# Patient Record
Sex: MALE | Race: WHITE | Employment: OTHER | URBAN - METROPOLITAN AREA
[De-identification: names, ages, dates, MRNs, and addresses within clinical notes are randomized per-mention and may not be internally consistent; named-entity substitution may affect disease eponyms.]

---

## 2020-06-06 ENCOUNTER — HOSPITAL ENCOUNTER (EMERGENCY)
Age: 55
Discharge: HOME | End: 2020-06-06
Attending: EMERGENCY MEDICINE
Payer: MEDICAID

## 2020-06-06 ENCOUNTER — APPOINTMENT (OUTPATIENT)
Dept: GENERAL RADIOLOGY | Age: 55
End: 2020-06-06
Payer: MEDICAID

## 2020-06-06 VITALS
TEMPERATURE: 97.8 F | SYSTOLIC BLOOD PRESSURE: 130 MMHG | HEART RATE: 84 BPM | OXYGEN SATURATION: 100 % | RESPIRATION RATE: 20 BRPM | DIASTOLIC BLOOD PRESSURE: 90 MMHG

## 2020-06-06 PROCEDURE — 99283 EMERGENCY DEPT VISIT LOW MDM: CPT

## 2020-06-06 PROCEDURE — 73090 X-RAY EXAM OF FOREARM: CPT

## 2020-06-06 PROCEDURE — 6370000000 HC RX 637 (ALT 250 FOR IP): Performed by: PHYSICIAN ASSISTANT

## 2020-06-06 PROCEDURE — 6370000000 HC RX 637 (ALT 250 FOR IP): Performed by: EMERGENCY MEDICINE

## 2020-06-06 RX ORDER — SULFAMETHOXAZOLE AND TRIMETHOPRIM 800; 160 MG/1; MG/1
1 TABLET ORAL 2 TIMES DAILY
Qty: 14 TABLET | Refills: 0 | Status: SHIPPED | OUTPATIENT
Start: 2020-06-06 | End: 2020-06-13

## 2020-06-06 RX ORDER — CEPHALEXIN 500 MG/1
1000 CAPSULE ORAL ONCE
Status: COMPLETED | OUTPATIENT
Start: 2020-06-06 | End: 2020-06-06

## 2020-06-06 RX ORDER — IBUPROFEN 800 MG/1
800 TABLET ORAL EVERY 8 HOURS PRN
Qty: 20 TABLET | Refills: 0 | Status: SHIPPED | OUTPATIENT
Start: 2020-06-06 | End: 2020-11-10 | Stop reason: ALTCHOICE

## 2020-06-06 RX ORDER — LIDOCAINE HYDROCHLORIDE AND EPINEPHRINE 10; 10 MG/ML; UG/ML
20 INJECTION, SOLUTION INFILTRATION; PERINEURAL ONCE
Status: DISCONTINUED | OUTPATIENT
Start: 2020-06-06 | End: 2020-06-06 | Stop reason: HOSPADM

## 2020-06-06 RX ORDER — SULFAMETHOXAZOLE AND TRIMETHOPRIM 800; 160 MG/1; MG/1
1 TABLET ORAL ONCE
Status: COMPLETED | OUTPATIENT
Start: 2020-06-06 | End: 2020-06-06

## 2020-06-06 RX ORDER — ACETAMINOPHEN 325 MG/1
650 TABLET ORAL ONCE
Status: COMPLETED | OUTPATIENT
Start: 2020-06-06 | End: 2020-06-06

## 2020-06-06 RX ORDER — CEPHALEXIN 500 MG/1
1000 CAPSULE ORAL 2 TIMES DAILY
Qty: 28 CAPSULE | Refills: 0 | Status: SHIPPED | OUTPATIENT
Start: 2020-06-06 | End: 2020-06-13

## 2020-06-06 RX ADMIN — ACETAMINOPHEN 650 MG: 325 TABLET ORAL at 03:03

## 2020-06-06 RX ADMIN — SULFAMETHOXAZOLE AND TRIMETHOPRIM 1 TABLET: 800; 160 TABLET ORAL at 02:57

## 2020-06-06 RX ADMIN — CEPHALEXIN 1000 MG: 500 CAPSULE ORAL at 02:57

## 2020-06-06 ASSESSMENT — ENCOUNTER SYMPTOMS
ABDOMINAL PAIN: 0
BACK PAIN: 0
SORE THROAT: 0
COUGH: 0
DIARRHEA: 0
SHORTNESS OF BREATH: 0
COLOR CHANGE: 1
FACIAL SWELLING: 0
NAUSEA: 0
RHINORRHEA: 0
VOMITING: 0
CHEST TIGHTNESS: 0

## 2020-06-06 ASSESSMENT — PAIN SCALES - GENERAL
PAINLEVEL_OUTOF10: 10
PAINLEVEL_OUTOF10: 10

## 2020-06-06 ASSESSMENT — PAIN DESCRIPTION - ORIENTATION: ORIENTATION: LEFT

## 2020-06-06 ASSESSMENT — PAIN DESCRIPTION - PAIN TYPE: TYPE: ACUTE PAIN

## 2020-06-06 ASSESSMENT — PAIN DESCRIPTION - LOCATION: LOCATION: ARM

## 2020-06-06 NOTE — ED NOTES
Discharge instructions and prescriptions reviewed with patient. Pt verbalized understanding. Pt discharged home with family.        Gail Early RN  06/06/20 1297

## 2020-06-06 NOTE — ED PROVIDER NOTES
ED Attending Attestation Note     Date of evaluation: 6/6/2020    This patient was seen by the advance practice provider. I have seen and examined the patient, agree with the workup, evaluation, management and diagnosis. The care plan has been discussed. My assessment reveals well appering 48 y.o. M with history of IVDU who presents with pain and swelling of the left forearm. Minimal erythema but edema. No palpable fluctuance. US shows cobblestoning of the subcutaneous tissues c/w cellulitis. No discrete abscess. PA performed I&D under my supervision with no purulent drainage. Plain film with no signs of gas and exam not consistent with necrotizing infection. Abx initiated. Importance of return for worsening sings of infection discussed with patient. No murmur or other stigmata of endocarditis.         Stephen Recio MD  06/06/20 9995

## 2020-08-03 ENCOUNTER — APPOINTMENT (OUTPATIENT)
Dept: GENERAL RADIOLOGY | Age: 55
End: 2020-08-03
Payer: MEDICAID

## 2020-08-03 ENCOUNTER — HOSPITAL ENCOUNTER (EMERGENCY)
Age: 55
Discharge: HOME | End: 2020-08-03
Attending: EMERGENCY MEDICINE
Payer: MEDICAID

## 2020-08-03 VITALS
HEIGHT: 71 IN | OXYGEN SATURATION: 99 % | WEIGHT: 170 LBS | BODY MASS INDEX: 23.8 KG/M2 | SYSTOLIC BLOOD PRESSURE: 134 MMHG | TEMPERATURE: 97.4 F | HEART RATE: 84 BPM | DIASTOLIC BLOOD PRESSURE: 84 MMHG | RESPIRATION RATE: 16 BRPM

## 2020-08-03 LAB
BASOPHILS ABSOLUTE: 0.1 K/UL (ref 0–0.2)
BASOPHILS RELATIVE PERCENT: 1.2 %
C-REACTIVE PROTEIN: 2 MG/L (ref 0–5.1)
EOSINOPHILS ABSOLUTE: 0.3 K/UL (ref 0–0.6)
EOSINOPHILS RELATIVE PERCENT: 6.6 %
HCT VFR BLD CALC: 40.7 % (ref 40.5–52.5)
HEMOGLOBIN: 14 G/DL (ref 13.5–17.5)
LYMPHOCYTES ABSOLUTE: 1.3 K/UL (ref 1–5.1)
LYMPHOCYTES RELATIVE PERCENT: 26.8 %
MCH RBC QN AUTO: 30.6 PG (ref 26–34)
MCHC RBC AUTO-ENTMCNC: 34.4 G/DL (ref 31–36)
MCV RBC AUTO: 88.9 FL (ref 80–100)
MONOCYTES ABSOLUTE: 0.7 K/UL (ref 0–1.3)
MONOCYTES RELATIVE PERCENT: 13.9 %
NEUTROPHILS ABSOLUTE: 2.6 K/UL (ref 1.7–7.7)
NEUTROPHILS RELATIVE PERCENT: 51.5 %
PDW BLD-RTO: 13.2 % (ref 12.4–15.4)
PLATELET # BLD: 284 K/UL (ref 135–450)
PMV BLD AUTO: 7 FL (ref 5–10.5)
RBC # BLD: 4.58 M/UL (ref 4.2–5.9)
SEDIMENTATION RATE, ERYTHROCYTE: 18 MM/HR (ref 0–20)
WBC # BLD: 5 K/UL (ref 4–11)

## 2020-08-03 PROCEDURE — 85025 COMPLETE CBC W/AUTO DIFF WBC: CPT

## 2020-08-03 PROCEDURE — 85652 RBC SED RATE AUTOMATED: CPT

## 2020-08-03 PROCEDURE — 99283 EMERGENCY DEPT VISIT LOW MDM: CPT

## 2020-08-03 PROCEDURE — 6370000000 HC RX 637 (ALT 250 FOR IP): Performed by: EMERGENCY MEDICINE

## 2020-08-03 PROCEDURE — 73610 X-RAY EXAM OF ANKLE: CPT

## 2020-08-03 PROCEDURE — 36415 COLL VENOUS BLD VENIPUNCTURE: CPT

## 2020-08-03 PROCEDURE — 86140 C-REACTIVE PROTEIN: CPT

## 2020-08-03 PROCEDURE — 73630 X-RAY EXAM OF FOOT: CPT

## 2020-08-03 RX ORDER — HYDROCODONE BITARTRATE AND ACETAMINOPHEN 5; 325 MG/1; MG/1
1 TABLET ORAL ONCE
Status: COMPLETED | OUTPATIENT
Start: 2020-08-03 | End: 2020-08-03

## 2020-08-03 RX ORDER — SULFAMETHOXAZOLE AND TRIMETHOPRIM 800; 160 MG/1; MG/1
1 TABLET ORAL 2 TIMES DAILY
Qty: 14 TABLET | Refills: 0 | Status: SHIPPED | OUTPATIENT
Start: 2020-08-03 | End: 2020-08-10

## 2020-08-03 RX ORDER — CEPHALEXIN 500 MG/1
500 CAPSULE ORAL 4 TIMES DAILY
Qty: 28 CAPSULE | Refills: 0 | Status: SHIPPED | OUTPATIENT
Start: 2020-08-03 | End: 2020-08-10

## 2020-08-03 RX ADMIN — HYDROCODONE BITARTRATE AND ACETAMINOPHEN 1 TABLET: 5; 325 TABLET ORAL at 17:25

## 2020-08-03 ASSESSMENT — PAIN DESCRIPTION - PAIN TYPE: TYPE: ACUTE PAIN

## 2020-08-03 ASSESSMENT — PAIN DESCRIPTION - ORIENTATION: ORIENTATION: RIGHT

## 2020-08-03 ASSESSMENT — PAIN SCALES - GENERAL
PAINLEVEL_OUTOF10: 9
PAINLEVEL_OUTOF10: 6

## 2020-08-03 ASSESSMENT — PAIN DESCRIPTION - LOCATION: LOCATION: ANKLE

## 2020-08-03 NOTE — ED PROVIDER NOTES
[address] ENCOUNTER          ATTENDING PHYSICIAN NOTE       Date of evaluation: 8/3/2020    Chief Complaint     Ankle Pain (right ankle injury x1 month ago, continued pain, swelling and redness. no breaks in skin)      History of Present Illness     Toma Burris is a 48 y.o. male who presents with pain in the dorsum of his foot. He reports that about 2 to 3 weeks ago he kicked a pole in the ground with the dorsum of his right foot, and an injury, area pain. He noticed a couple of weeks ago that it began to swell up in this area, and says developed some redness and persistent irritation in that time. Reports he feels a bit more painful now. Denies any pain in the lower leg or any proximal to the ankle. Reports that he had such significant pain that he had difficulty driving this evening and subsequently came in. Denies any fevers or chills. Denies any breaks in the skin or additional trauma to the area. He reports that the pain is located in the same position where he did kick this piece of metal with the dorsum of his foot. Review of Systems     Review of Systems  Pertinent positives and negatives are listed in the HPI; otherwise all systems are reviewed and were negative  Past Medical, Surgical, Family, and Social History     He has no past medical history on file. He has no past surgical history on file. His family history is not on file. He reports that he has been smoking cigarettes. He has been smoking about 0.50 packs per day. He has never used smokeless tobacco. He reports previous alcohol use. He reports current drug use. Frequency: 7.00 times per week. Drugs: Cocaine, Marijuana, IV, and Methamphetamines. Medications     Previous Medications    IBUPROFEN (ADVIL;MOTRIN) 800 MG TABLET    Take 1 tablet by mouth every 8 hours as needed for Pain (Take with food)       Allergies     He has No Known Allergies.     Physical Exam     INITIAL VITALS: BP: 136/85, Temp: 97.4 °F (36.3 °C), Pulse: 87, Resp: 16, SpO2: 97 %   Physical Exam  Constitutional:       Appearance: Normal appearance. Neck:      Musculoskeletal: Normal range of motion. Cardiovascular:      Rate and Rhythm: Normal rate. Pulses: Normal pulses. Pulmonary:      Effort: Pulmonary effort is normal. No respiratory distress. Breath sounds: No wheezing or rales. Chest:      Chest wall: No tenderness. Musculoskeletal:      Comments: Erythema and swelling to the lateral dorsum of the foot on the right extending proximally along the lateral aspect of the ankle with some associated swelling; no swelling proximal to the ankle itself. No venous engorgement. There is tenderness along the area of erythema, not on the medial aspect of the ankle. Range of motion of the ankle is preserved actively and passively   Neurological:      General: No focal deficit present. Mental Status: He is alert and oriented to person, place, and time. Diagnostic Results     EKG       RADIOLOGY:  XR ANKLE RIGHT (MIN 3 VIEWS)   Final Result   1. Soft tissue swelling adjacent to the lateral malleolus. 2. No acute fracture in the ankle or foot. XR FOOT RIGHT (MIN 3 VIEWS)   Final Result   1. Soft tissue swelling adjacent to the lateral malleolus. 2. No acute fracture in the ankle or foot.           LABS:   Results for orders placed or performed during the hospital encounter of 08/03/20   CRP   Result Value Ref Range    CRP 2.0 0.0 - 5.1 mg/L   CBC Auto Differential   Result Value Ref Range    WBC 5.0 4.0 - 11.0 K/uL    RBC 4.58 4.20 - 5.90 M/uL    Hemoglobin 14.0 13.5 - 17.5 g/dL    Hematocrit 40.7 40.5 - 52.5 %    MCV 88.9 80.0 - 100.0 fL    MCH 30.6 26.0 - 34.0 pg    MCHC 34.4 31.0 - 36.0 g/dL    RDW 13.2 12.4 - 15.4 %    Platelets 178 484 - 349 K/uL    MPV 7.0 5.0 - 10.5 fL    Neutrophils % 51.5 %    Lymphocytes % 26.8 %    Monocytes % 13.9 %    Eosinophils % 6.6 %    Basophils % 1.2 %    Neutrophils Absolute 2.6 1.7 - 7.7 K/uL    Lymphocytes Absolute 1.3 1.0 - 5.1 K/uL    Monocytes Absolute 0.7 0.0 - 1.3 K/uL    Eosinophils Absolute 0.3 0.0 - 0.6 K/uL    Basophils Absolute 0.1 0.0 - 0.2 K/uL       ED BEDSIDE ULTRASOUND:      RECENT VITALS:  BP: 134/84,Temp: 97.4 °F (36.3 °C), Pulse: 84, Resp: 16, SpO2: 99 %     Procedures         ED Course     Nursing Notes, Past Medical Hx, Past Surgical Hx, Social Hx,Allergies, and Family Hx were reviewed. patient was given the following medications:  Orders Placed This Encounter   Medications    HYDROcodone-acetaminophen (NORCO) 5-325 MG per tablet 1 tablet    sulfamethoxazole-trimethoprim (BACTRIM DS) 800-160 MG per tablet     Sig: Take 1 tablet by mouth 2 times daily for 7 days     Dispense:  14 tablet     Refill:  0    cephALEXin (KEFLEX) 500 MG capsule     Sig: Take 1 capsule by mouth 4 times daily for 7 days     Dispense:  28 capsule     Refill:  0       CONSULTS:  None    MEDICAL DECISIONMAKING / ASSESSMENT / PLAN     Alice Porras is a 48 y.o. male who presents with swelling and redness to the dorsum of the foot extending up towards the ankle. Given location, labs were obtained which demonstrate no significant leukocytosis, no significant elevation inflammatory markers. Given that this appears to be more change of cellulitis, not specifically affecting the ankle joint, and he is able to range the ankle joint and his laboratory work-up that does not show elevation his inflammatory markers or leukocytosis, I have very low suspicion that this represents septic joint. Radiographs are likewise unremarkable, no evidence of severe trauma. We will prescribe Bactrim and Keflex for treatment of cellulitis, given return precautions, and referral to the [facility] clinic for PCP. Of note, patient denies any IV drug use, or any injections or wounds in the foot specifically in this area. Clinical Impression     1.  Cellulitis of foot, right        Disposition PATIENT REFERRED TO:  [facility] AT [place] [address] [address] [facility] [address] [phone]            DISCHARGE MEDICATIONS:  New Prescriptions    CEPHALEXIN (KEFLEX) 500 MG CAPSULE    Take 1 capsule by mouth 4 times daily for 7 days    SULFAMETHOXAZOLE-TRIMETHOPRIM (BACTRIM DS) 800-160 MG PER TABLET    Take 1 tablet by mouth 2 times daily for 7 days       DISPOSITION Decision To Discharge 08/03/2020 06:43:55 PM          Alisha Lucas MD  08/03/20 7999

## 2020-08-24 ENCOUNTER — HOSPITAL ENCOUNTER (INPATIENT)
Age: 55
LOS: 2 days | Discharge: HOME | DRG: 558 | End: 2020-08-27
Attending: EMERGENCY MEDICINE | Admitting: INTERNAL MEDICINE
Payer: MEDICAID

## 2020-08-24 PROCEDURE — 99285 EMERGENCY DEPT VISIT HI MDM: CPT

## 2020-08-24 ASSESSMENT — PAIN DESCRIPTION - FREQUENCY: FREQUENCY: CONTINUOUS

## 2020-08-24 ASSESSMENT — PAIN SCALES - GENERAL: PAINLEVEL_OUTOF10: 10

## 2020-08-24 ASSESSMENT — PAIN DESCRIPTION - DESCRIPTORS: DESCRIPTORS: ACHING

## 2020-08-24 ASSESSMENT — PAIN DESCRIPTION - LOCATION: LOCATION: GENERALIZED

## 2020-08-24 ASSESSMENT — PAIN DESCRIPTION - PAIN TYPE: TYPE: ACUTE PAIN

## 2020-08-25 ENCOUNTER — APPOINTMENT (OUTPATIENT)
Dept: GENERAL RADIOLOGY | Age: 55
DRG: 558 | End: 2020-08-25
Payer: MEDICAID

## 2020-08-25 ENCOUNTER — APPOINTMENT (OUTPATIENT)
Dept: CT IMAGING | Age: 55
DRG: 558 | End: 2020-08-25
Payer: MEDICAID

## 2020-08-25 PROBLEM — L08.9: Status: ACTIVE | Noted: 2020-08-25

## 2020-08-25 PROBLEM — M00.9 SEPTIC ARTHRITIS (HCC): Status: ACTIVE | Noted: 2020-08-25

## 2020-08-25 PROBLEM — S90.511S: Status: ACTIVE | Noted: 2020-08-25

## 2020-08-25 LAB
ALBUMIN SERPL-MCNC: 3.8 G/DL (ref 3.4–5)
ANION GAP SERPL CALCULATED.3IONS-SCNC: 11 MMOL/L (ref 3–16)
ANION GAP SERPL CALCULATED.3IONS-SCNC: 7 MMOL/L (ref 3–16)
BASOPHILS ABSOLUTE: 0.1 K/UL (ref 0–0.2)
BASOPHILS RELATIVE PERCENT: 1.2 %
BUN BLDV-MCNC: 13 MG/DL (ref 7–20)
BUN BLDV-MCNC: 15 MG/DL (ref 7–20)
C-REACTIVE PROTEIN: 8.6 MG/L (ref 0–5.1)
CALCIUM SERPL-MCNC: 9 MG/DL (ref 8.3–10.6)
CALCIUM SERPL-MCNC: 9.1 MG/DL (ref 8.3–10.6)
CHLORIDE BLD-SCNC: 100 MMOL/L (ref 99–110)
CHLORIDE BLD-SCNC: 100 MMOL/L (ref 99–110)
CO2: 25 MMOL/L (ref 21–32)
CO2: 28 MMOL/L (ref 21–32)
CREAT SERPL-MCNC: 0.8 MG/DL (ref 0.9–1.3)
CREAT SERPL-MCNC: 0.8 MG/DL (ref 0.9–1.3)
EOSINOPHILS ABSOLUTE: 0.2 K/UL (ref 0–0.6)
EOSINOPHILS RELATIVE PERCENT: 4.3 %
GFR AFRICAN AMERICAN: >60
GFR AFRICAN AMERICAN: >60
GFR NON-AFRICAN AMERICAN: >60
GFR NON-AFRICAN AMERICAN: >60
GLUCOSE BLD-MCNC: 116 MG/DL (ref 70–99)
GLUCOSE BLD-MCNC: 128 MG/DL (ref 70–99)
HCT VFR BLD CALC: 40.3 % (ref 40.5–52.5)
HEMOGLOBIN: 13.8 G/DL (ref 13.5–17.5)
LYMPHOCYTES ABSOLUTE: 1.5 K/UL (ref 1–5.1)
LYMPHOCYTES RELATIVE PERCENT: 26.8 %
MCH RBC QN AUTO: 30.1 PG (ref 26–34)
MCHC RBC AUTO-ENTMCNC: 34.2 G/DL (ref 31–36)
MCV RBC AUTO: 87.9 FL (ref 80–100)
MONOCYTES ABSOLUTE: 0.8 K/UL (ref 0–1.3)
MONOCYTES RELATIVE PERCENT: 15.2 %
NEUTROPHILS ABSOLUTE: 2.9 K/UL (ref 1.7–7.7)
NEUTROPHILS RELATIVE PERCENT: 52.5 %
PDW BLD-RTO: 13.4 % (ref 12.4–15.4)
PHOSPHORUS: 4 MG/DL (ref 2.5–4.9)
PLATELET # BLD: 278 K/UL (ref 135–450)
PMV BLD AUTO: 7 FL (ref 5–10.5)
POTASSIUM REFLEX MAGNESIUM: 3.8 MMOL/L (ref 3.5–5.1)
POTASSIUM SERPL-SCNC: 4 MMOL/L (ref 3.5–5.1)
RBC # BLD: 4.59 M/UL (ref 4.2–5.9)
SEDIMENTATION RATE, ERYTHROCYTE: 18 MM/HR (ref 0–20)
SODIUM BLD-SCNC: 135 MMOL/L (ref 136–145)
SODIUM BLD-SCNC: 136 MMOL/L (ref 136–145)
WBC # BLD: 5.5 K/UL (ref 4–11)

## 2020-08-25 PROCEDURE — 6370000000 HC RX 637 (ALT 250 FOR IP): Performed by: STUDENT IN AN ORGANIZED HEALTH CARE EDUCATION/TRAINING PROGRAM

## 2020-08-25 PROCEDURE — 85652 RBC SED RATE AUTOMATED: CPT

## 2020-08-25 PROCEDURE — 99254 IP/OBS CNSLTJ NEW/EST MOD 60: CPT | Performed by: INTERNAL MEDICINE

## 2020-08-25 PROCEDURE — 2580000003 HC RX 258: Performed by: STUDENT IN AN ORGANIZED HEALTH CARE EDUCATION/TRAINING PROGRAM

## 2020-08-25 PROCEDURE — 73610 X-RAY EXAM OF ANKLE: CPT

## 2020-08-25 PROCEDURE — 86140 C-REACTIVE PROTEIN: CPT

## 2020-08-25 PROCEDURE — 6360000002 HC RX W HCPCS: Performed by: STUDENT IN AN ORGANIZED HEALTH CARE EDUCATION/TRAINING PROGRAM

## 2020-08-25 PROCEDURE — 80069 RENAL FUNCTION PANEL: CPT

## 2020-08-25 PROCEDURE — 36415 COLL VENOUS BLD VENIPUNCTURE: CPT

## 2020-08-25 PROCEDURE — 6360000004 HC RX CONTRAST MEDICATION: Performed by: STUDENT IN AN ORGANIZED HEALTH CARE EDUCATION/TRAINING PROGRAM

## 2020-08-25 PROCEDURE — 1200000000 HC SEMI PRIVATE

## 2020-08-25 PROCEDURE — 73080 X-RAY EXAM OF ELBOW: CPT

## 2020-08-25 PROCEDURE — 83036 HEMOGLOBIN GLYCOSYLATED A1C: CPT

## 2020-08-25 PROCEDURE — 85025 COMPLETE CBC W/AUTO DIFF WBC: CPT

## 2020-08-25 PROCEDURE — 6360000002 HC RX W HCPCS: Performed by: INTERNAL MEDICINE

## 2020-08-25 PROCEDURE — 87040 BLOOD CULTURE FOR BACTERIA: CPT

## 2020-08-25 PROCEDURE — 73701 CT LOWER EXTREMITY W/DYE: CPT

## 2020-08-25 RX ORDER — PROMETHAZINE HYDROCHLORIDE 12.5 MG/1
12.5 TABLET ORAL EVERY 6 HOURS PRN
Status: DISCONTINUED | OUTPATIENT
Start: 2020-08-25 | End: 2020-08-27 | Stop reason: HOSPADM

## 2020-08-25 RX ORDER — ACETAMINOPHEN 650 MG/1
650 SUPPOSITORY RECTAL EVERY 6 HOURS PRN
Status: DISCONTINUED | OUTPATIENT
Start: 2020-08-25 | End: 2020-08-27 | Stop reason: HOSPADM

## 2020-08-25 RX ORDER — SODIUM CHLORIDE 0.9 % (FLUSH) 0.9 %
10 SYRINGE (ML) INJECTION PRN
Status: DISCONTINUED | OUTPATIENT
Start: 2020-08-25 | End: 2020-08-27 | Stop reason: HOSPADM

## 2020-08-25 RX ORDER — ONDANSETRON 2 MG/ML
4 INJECTION INTRAMUSCULAR; INTRAVENOUS EVERY 6 HOURS PRN
Status: DISCONTINUED | OUTPATIENT
Start: 2020-08-25 | End: 2020-08-27 | Stop reason: HOSPADM

## 2020-08-25 RX ORDER — ACETAMINOPHEN 325 MG/1
650 TABLET ORAL EVERY 6 HOURS PRN
Status: DISCONTINUED | OUTPATIENT
Start: 2020-08-25 | End: 2020-08-27 | Stop reason: HOSPADM

## 2020-08-25 RX ORDER — POLYETHYLENE GLYCOL 3350 17 G/17G
17 POWDER, FOR SOLUTION ORAL DAILY PRN
Status: DISCONTINUED | OUTPATIENT
Start: 2020-08-25 | End: 2020-08-27 | Stop reason: HOSPADM

## 2020-08-25 RX ORDER — SODIUM CHLORIDE 0.9 % (FLUSH) 0.9 %
10 SYRINGE (ML) INJECTION EVERY 12 HOURS SCHEDULED
Status: DISCONTINUED | OUTPATIENT
Start: 2020-08-25 | End: 2020-08-27 | Stop reason: HOSPADM

## 2020-08-25 RX ORDER — KETOROLAC TROMETHAMINE 15 MG/ML
15 INJECTION, SOLUTION INTRAMUSCULAR; INTRAVENOUS EVERY 6 HOURS PRN
Status: DISCONTINUED | OUTPATIENT
Start: 2020-08-25 | End: 2020-08-27 | Stop reason: HOSPADM

## 2020-08-25 RX ORDER — NICOTINE 21 MG/24HR
1 PATCH, TRANSDERMAL 24 HOURS TRANSDERMAL DAILY
Status: DISCONTINUED | OUTPATIENT
Start: 2020-08-25 | End: 2020-08-27 | Stop reason: HOSPADM

## 2020-08-25 RX ADMIN — VANCOMYCIN HYDROCHLORIDE 1750 MG: 10 INJECTION, POWDER, LYOPHILIZED, FOR SOLUTION INTRAVENOUS at 13:08

## 2020-08-25 RX ADMIN — Medication 10 ML: at 08:36

## 2020-08-25 RX ADMIN — KETOROLAC TROMETHAMINE 15 MG: 15 INJECTION, SOLUTION INTRAMUSCULAR; INTRAVENOUS at 05:41

## 2020-08-25 RX ADMIN — IOPAMIDOL 80 ML: 755 INJECTION, SOLUTION INTRAVENOUS at 02:23

## 2020-08-25 RX ADMIN — ENOXAPARIN SODIUM 40 MG: 40 INJECTION SUBCUTANEOUS at 18:22

## 2020-08-25 RX ADMIN — Medication 10 ML: at 08:37

## 2020-08-25 RX ADMIN — Medication 10 ML: at 22:18

## 2020-08-25 ASSESSMENT — ENCOUNTER SYMPTOMS
VOMITING: 0
COUGH: 0
ABDOMINAL PAIN: 0
PHOTOPHOBIA: 0
SHORTNESS OF BREATH: 0
TROUBLE SWALLOWING: 0
DIARRHEA: 0
NAUSEA: 0
WHEEZING: 0
ABDOMINAL DISTENTION: 0
RHINORRHEA: 0

## 2020-08-25 ASSESSMENT — PAIN SCALES - GENERAL
PAINLEVEL_OUTOF10: 8
PAINLEVEL_OUTOF10: 0

## 2020-08-25 ASSESSMENT — PAIN DESCRIPTION - PAIN TYPE: TYPE: ACUTE PAIN

## 2020-08-25 ASSESSMENT — PAIN DESCRIPTION - LOCATION: LOCATION: GENERALIZED

## 2020-08-25 ASSESSMENT — PAIN - FUNCTIONAL ASSESSMENT: PAIN_FUNCTIONAL_ASSESSMENT: PREVENTS OR INTERFERES SOME ACTIVE ACTIVITIES AND ADLS

## 2020-08-25 ASSESSMENT — PAIN DESCRIPTION - FREQUENCY: FREQUENCY: CONTINUOUS

## 2020-08-25 ASSESSMENT — PAIN DESCRIPTION - ONSET: ONSET: ON-GOING

## 2020-08-25 ASSESSMENT — PAIN DESCRIPTION - DESCRIPTORS: DESCRIPTORS: ACHING;NAGGING

## 2020-08-25 ASSESSMENT — PAIN DESCRIPTION - PROGRESSION: CLINICAL_PROGRESSION: NOT CHANGED

## 2020-08-25 ASSESSMENT — PAIN DESCRIPTION - ORIENTATION: ORIENTATION: RIGHT

## 2020-08-25 NOTE — H&P
has been smoking about 0.50 packs per day. He has never used smokeless tobacco.  ETOH:   reports current alcohol use. DRUG: previous IV drug use, last time a few months ago      Family History:      Reviewed in detail and negative for DM, CAD, Cancer, CVA. Positive as follows:    History reviewed. No pertinent family history. OF SYSTEMS:   Pertinent positives as noted in the HPI. All other systems reviewed and negative. ROS: Review of Systems   Constitutional: Negative for activity change, appetite change, chills, fatigue and fever. HENT: Negative for congestion, rhinorrhea and trouble swallowing. Eyes: Negative for photophobia and visual disturbance. Respiratory: Negative for cough, shortness of breath and wheezing. Cardiovascular: Negative for chest pain, palpitations and leg swelling. Gastrointestinal: Negative for abdominal distention, abdominal pain, diarrhea, nausea and vomiting. Genitourinary: Negative for difficulty urinating, discharge, dysuria, frequency, penile pain and urgency. Musculoskeletal: Positive for arthralgias, gait problem and joint swelling. Negative for myalgias and neck pain. Neurological: Negative for dizziness, tremors, seizures, syncope and weakness. PHYSICAL EXAM PERFORMED:    /80   Pulse 87   Temp 97.3 °F (36.3 °C) (Oral)   Resp 18   Ht 6' (1.829 m)   Wt 175 lb 12.8 oz (79.7 kg)   SpO2 98%   BMI 23.84 kg/m²     Physical Exam  Constitutional:       General: He is not in acute distress. Appearance: He is not diaphoretic. HENT:      Head: Normocephalic and atraumatic. Mouth/Throat:      Mouth: Mucous membranes are moist.      Pharynx: Oropharynx is clear. No oropharyngeal exudate. Eyes:      General: No scleral icterus. Extraocular Movements: Extraocular movements intact. Conjunctiva/sclera: Conjunctivae normal.   Cardiovascular:      Rate and Rhythm: Normal rate and regular rhythm. Pulses: Normal pulses.       Heart sounds: No murmur. No gallop. Pulmonary:      Effort: Pulmonary effort is normal. No respiratory distress. Breath sounds: Normal breath sounds. No wheezing. Abdominal:      General: There is no distension. Palpations: Abdomen is soft. Tenderness: There is no abdominal tenderness. Musculoskeletal:         General: Swelling (R ankle) and tenderness present. Comments: Decrease ROM of R ankle with passive flexion and extension. R elbow decrease flexion 2/2 pain   Skin:     Capillary Refill: Capillary refill takes less than 2 seconds. Findings: Erythema (R ankle) present. Neurological:      General: No focal deficit present. Mental Status: He is alert and oriented to person, place, and time. Sensory: No sensory deficit. Labs:     Recent Labs     08/25/20 0118   WBC 5.5   HGB 13.8   HCT 40.3*        Recent Labs     08/25/20 0118      K 3.8      CO2 25   BUN 15   CREATININE 0.8*   CALCIUM 9.1     No results for input(s): AST, ALT, BILIDIR, BILITOT, ALKPHOS in the last 72 hours. No results for input(s): INR in the last 72 hours. No results for input(s): Dulcie Mean in the last 72 hours. Urinalysis:   No results found for: Brunetta Daily, BACTERIA, RBCUA, BLOODU, [address], [place] [address]    Radiology:     CT ANKLE RIGHT W CONTRAST   Final Result      XR ANKLE RIGHT (MIN 3 VIEWS)   Final Result     Persistent lateral right ankle swelling. No fracture or dislocation. XR ELBOW RIGHT (MIN 3 VIEWS)   Final Result   Cortical irregularity to the right radial head. Favor degenerative    change rather than fracture, particularly given lack of joint effusion.                ASSESSMENT & PLAN:    R ankle pain - concerning for septic arthritis vs autoimmune response; elevated CRP, failure of outpatient Abx therapy  - patient stable at this time will hold off on ABx until after arthrocentesis and then treat empirically   - Patient has hx of IVDU will start Vanc for MRSA coverage  - Orthopedic Surgery consulted for arthrocentesis   - Toradol PRN for pain  - blood culture x2    Hx of Tobacco use  - nicotine patch PRN    DVT Prophylaxis: holding for arthrocentesis  Diet: Diet NPO Effective Now  Code Status: Full Code    Dispo - General Medical     I will discuss the patient with the senior resident and Brandyn Gregg, DO    Johnson Score, DO  Internal Medicine Resident, PGY-3  Reach me via [facility]

## 2020-08-25 NOTE — PROGRESS NOTES
4 Eyes Admission Assessment     I agree as the admission nurse that 2 RN's have performed a thorough Head to Toe Skin Assessment on the patient. ALL assessment sites listed below have been assessed on admission. Areas assessed by both nurses:   [x]   Head, Face, and Ears   [x]   Shoulders, Back, and Chest  [x]   Arms, Elbows, and Hands   [x]   Coccyx, Sacrum, and Ischum  [x]   Legs, Feet, and Heels        Does the Patient have Skin Breakdown?   No         Madhav Prevention initiated:  No   Wound Care Orders initiated:  No      [facility] nurse consulted for Pressure Injury (Stage 3,4, Unstageable, DTI, NWPT, and Complex wounds) or Madhav score 18 or lower:  No      Nurse 1 eSignature: Electronically signed by Ramses Moreno RN on 8/25/20 at 5:34 AM EDT    **SHARE this note so that the co-signing nurse is able to place an eSignature**    Nurse 2 eSignature: Electronically signed by Adelia Gunn RN on 8/25/20 at 5:36 AM EDT

## 2020-08-25 NOTE — CONSULTS
Clinical Pharmacy Consult Note    Admit date: 8/24/2020    Subjective/Objective:  49 yo male with PMHx that includes IVDU presented to ED with complaints of R ankle pain which is concerning for cellulitis versus possible septic arthritis. Pharmacy is consulted to dose Vancomycin per Dr. Pina Matthews    Pertinent Medications:  Vancomycin IV; Pharmacy to dose -- day # 1    Recent Labs     08/25/20  0118 08/25/20  0549    135*   K 3.8 4.0    100   CO2 25 28   PHOS  --  4.0   BUN 15 13   CREATININE 0.8* 0.8*       Estimated Creatinine Clearance: 116 mL/min (A) (based on SCr of 0.8 mg/dL (L)). Recent Labs     08/25/20  0118   WBC 5.5   HGB 13.8   HCT 40.3*   MCV 87.9          Height:  6' (182.9 cm)  Weight: 175 lb 12.8 oz (79.7 kg)    Micro: Blood cx is pending    Assessment/Plan:  Vancomycin  · Will start therapy with vancomycin 1250 mg every 12 hours  (~16 mg/kg)  · Clinical pharmacist will follow-up in AM.  · Renal function will be monitored closely and dosing will be adjusted as appropriate. Please call with any questions. Thank you for consulting pharmacy!   Jayden Dunaway Rph  8/25/2020 6:35 AM

## 2020-08-25 NOTE — PLAN OF CARE
Problem: Pain:  Goal: Pain level will decrease  Description: Pain level will decrease  8/25/2020 1604 by Camden Tineo RN  Outcome: Ongoing  Note: Patient is is not complaining of pain at this time. Received Toradol for pain early this morning. Pain has resolved. 8/25/2020 1604 by Camden Tineo RN  Outcome: Ongoing  Note: Patient is at low risk for falls. Discussed with patient the importance of wearing non-skid socks when ambulating. Ambulates with a steady gate. Patient calls out appropriately.

## 2020-08-25 NOTE — CONSULTS
sensory change or other neurologic symptom    Denies new / worse depression, psychiatric symptoms  Denies any Endocrine or Hematologic symptoms    PHYSICAL EXAM:      Vitals:    /80   Pulse 102   Temp 97.8 °F (36.6 °C) (Oral)   Resp 18   Ht 6' (1.829 m)   Wt 175 lb 12.8 oz (79.7 kg)   SpO2 98%   BMI 23.84 kg/m²     GENERAL: No apparent distress. HEENT: Membranes moist, no oral lesion, PERRL  NECK:  Supple, no lymphadenopaty  LUNGS: Clear b/l, no rales, no dullness  CARDIAC: RRR, no murmur appreciated  ABD:  + BS, soft / NT  EXT:  No rash, no edema, no lesions - R ankle swelling, no redness / warmth, movement - flexion / extension / inversion - eversion without significant pain  NEURO: No focal neurologic findings  PSYCH: Orientation, sensorium, mood normal  LINES:  Peripheral iv    DATA:    Lab Results   Component Value Date    WBC 5.5 2020    HGB 13.8 2020    HCT 40.3 (L) 2020    MCV 87.9 2020     2020     Lab Results   Component Value Date    CREATININE 0.8 (L) 2020    BUN 13 2020     (L) 2020    K 4.0 2020     2020    CO2 28 2020       Hepatic Function Panel:   Lab Results   Component Value Date    LABALBU 3.8 2020       Micro:   BC sent    Imagin/24 R ankle CT    IMPRESSION:        Diffuse subcutaneous edema with some more focal muscular edema to the    lower extensor compartment.  2 cm sliver of fluid in this compartment    could reflect a small abscess in the proper clinical setting.  No    findings of osteomyelitis.  No fracture. IMPRESSION:      Patient Active Problem List   Diagnosis    Septic arthritis ([facility] [address]    Abrasion, ankle with infection, right, sequela       R ankle swelling - improved on vanco, no clincal evidence for septic arthritis. May have had injury and improved since he has been off feet / non-weight bearing for a day. Normal ESR / mild increase in CRP.       May have injury related pain / swelling / hyperemia. NOT clear there is an infection at all.     RECOMMENDATIONS:    Cont vancomycin for now  Ortho consult  Consider MRI to better define ankle ST / tendon / bone    Discussed with pt  Wayne Thomas MD

## 2020-08-25 NOTE — PROGRESS NOTES
Clinical Pharmacy Consult Note     Admit date: 8/24/2020     Subjective/Objective:  49 yo male with PMHx that includes IVDU presented to ED with complaints of R ankle pain which is concerning for cellulitis versus possible septic arthritis.      Pharmacy is consulted to dose Vancomycin per Dr. Regan Comment     Pertinent Medications:  Vancomycin -- Pharmacy to Dose -- day # 1   1.75 g IV once - to be given after arthrocentesis    1.25 g IV q12h (8/25 - current)    Pertinent Labs:    Recent Labs     08/25/20  0118 08/25/20  0549    135*   K 3.8 4.0    100   CO2 25 28   PHOS  --  4.0   BUN 15 13   CREATININE 0.8* 0.8*       Estimated Creatinine Clearance: 116 mL/min (A) (based on SCr of 0.8 mg/dL (L)). Recent Labs     08/25/20  0118   WBC 5.5   HGB 13.8   HCT 40.3*   MCV 87.9          Height:  6' (182.9 cm)  Weight: 175 lb 12.8 oz (79.7 kg)    Micro:  Date Site Micro Susceptibility   8/25 Blood Cx Pending              Assessment/Plan:  1. Possible septic arthritis: Vancomycin --  day # 1  Vancomycin -- Pharmacy to Dose  · Never received 1.5 g IV loading dose - d/c'd. · Missed first dose of maintenance -  held for after arthrocentesis. · Ordered a loading dose 1.75 g IV once to be given after procedure. · Continue 1.25 g IV q12h. Provides ~ 16 mg/kg and is based on a half-life elimination of 7 hours. · Trough goal 15-20 mcg/mL for septic arthritis. Troughs will be ordered as appropriate. · Clinical pharmacist will follow-up in AM.  · Renal function will be monitored closely and dosing will be adjusted as appropriate. Please call with any questions. Thank you for consulting pharmacy!   Dilcia Montemayor, PharmD  PGY1 Pharmacy Resident   Wireless: [phone]  8/25/2020 8:35 AM

## 2020-08-25 NOTE — ED TRIAGE NOTES
Pt arrives to ED with co generalized body aches, stating joints hurt all over. Pt also states was here a couple weeks ago for cellulitis of R foot and was given antibiotics and completed the antibiotics, but R foot still continues to be red and swollen. VSS, afebrile.   Will continue to monitor

## 2020-08-25 NOTE — CARE COORDINATION
Case Management Assessment           Initial Evaluation                Date / Time of Evaluation: 8/25/2020 3:58 PM                 Assessment Completed by: Suzanne Alicea    Patient Name: Aniket Cobos     YOB: 1965  Diagnosis: Septic arthritis ([facility] [address] [M00.9]  Septic arthritis ([facility] [address] [M00.9]  Abrasion, ankle with infection, right, sequela [S90.511S, L08.9]     Date / Time: 8/24/2020 11:33 PM    Patient Admission Status: Inpatient    If patient is discharged prior to next notation, then this note serves as note for discharge by case management. Current PCP: No primary care provider on file. Clinic Patient: No    Chart Reviewed: Yes  Patient/ Family Interviewed: Yes    Initial assessment completed at bedside with: pt      Hospitalization in the last 30 days: No    Emergency Contacts:  Extended Emergency Contact Information  Primary Emergency Contact: Toro Goff  Home Phone: [phone]  Relation: Other    Advance Directives:   Code Status: Full [address]: No  Agent: NA  Contact Number: NA    Copy present: No     In paper Chart: No    Scanned into EMR No    Financial  Payor: Marie Quinteros / Plan: Charls Cogan / Product Type: *No Product type* /     Pre-cert required for SNF: Yes    [address] [address]  Phone: [phone] Fax: [phone]      Potential assistance Purchasing Medications: Potential Assistance Purchasing Medications: Yes  Does Patient want to participate in local refill/ meds to beds program?: No    Meds To Beds General Rules:  1. Can ONLY be done Monday- Friday between 8:30am-5pm  2. Prescription(s) must be in pharmacy by 3pm to be filled same day  3. Copy of patient's insurance/ prescription drug card and patient face sheet must be sent along with the prescription(s)  4.  Cost of Rx cannot be added to hospital bill. If financial assistance is needed, please contact unit  or ;  or  CANNOT provide pharmacy voucher for patients co-pays  5. Patients can then  the prescription on their way out of the hospital at discharge, or pharmacy can deliver to the bedside if staff is available. (payment due at time of pick-up or delivery - cash, check, or card accepted)     Able to afford home medications/ co-pay costs: Yes    ADLS  Support Systems: Friends/Neighbors, Parent    PT AM-PAC:     OT AM-PAC:       [place]: home  W/  SO   Steps: no steps  Lives  In a ranch style  Home,   All one level    Plans to RETURN to current housing: Yes  Barriers to RETURNING to current housin [address]  Currently ACTIVE with  [address]: Yes  [address]: Not Applicable    Currently ACTIVE with [place] on Aging: No  Passport/ Waiver: No  Passport/ Waiver Services: Not Applicable    : [facility] Phone: [address]  DME Provider: NA  Equipment: NA    Home Oxygen and [address] prior to admission: No  [address]: Not Applicable  Other Respiratory Equipment: NA       Dialysis  Active with HD/PD prior to admission: No  Nephrologist: NA    HD Center:  Not Applicable    DISCHARGE PLAN:  Disposition: Home- No Services Needed    Transportation PLAN for discharge: family     Factors facilitating achievement of predicted outcomes: Friend support, Motivated, Cooperative and Pleasant    Barriers to discharge: Pain and ID  rec  For  Atbx     Additional Case Management Notes: CM following for  D/c planning and  Needs  , pt plans to return home at  discharge  , independent  Wit ADL'd s and ambulation , no needs at this time , ID  Consulted  If  IV atbx  Would be needed with PICC line  , CM would  Assist with HHC  And Infusion . Cont to follow .      Plan  Per  MD  Documentation   Likely has a

## 2020-08-25 NOTE — PLAN OF CARE
Problem: Pain:  Goal: Pain level will decrease  Description: Pain level will decrease  Outcome: Ongoing  Note: Pt complaining of R foot/elbow pain 8/10. Pt given Ketorolac 15mg per orders (see eMAR). Upon reassessment patient was sleeping with   RR >10. Problem: Falls - Risk of:  Goal: Will remain free from falls  Description:   Outcome: Ongoing  Note: Patient at risk for falls. Patient resting quietly in bed. Side rails up x 2. Bed locked in lowest position. Bed alarm on. Bedside table and call light within reach. Patient instructed to call for assistance. Patient verbalized understanding. Will continue to monitor.

## 2020-08-25 NOTE — PROGRESS NOTES
Progress Note    Admit Date: 8/24/2020  Day: 1  Diet: Diet NPO Effective Now    CC: Septic joint vs. Autoimmune arthritis     Interval history: Patient endorses right ankle pain. States he is also having R knee pain. On exam R ankle is swollen and nonerythematous. R knee nonerythematous and without swelling. HPI: 47 yo M with limited PMH IVDU presented with R ankle monoarthritis, CT- diffuse subcutaneous edema with focal muscular edema and 2 cm cell vs. Fluid poss abscess. Failed joint aspiration in the ED. Surgery on board for I&D. Medications:     Scheduled Meds:   sodium chloride flush  10 mL Intravenous 2 times per day    nicotine  1 patch Transdermal Daily    vancomycin  15 mg/kg Intravenous Q12H     Continuous Infusions:  PRN Meds:sodium chloride flush, acetaminophen **OR** acetaminophen, polyethylene glycol, promethazine **OR** ondansetron, ketorolac    Objective:   Vitals:   T-max:  Patient Vitals for the past 8 hrs:   BP Temp Temp src Pulse Resp SpO2 Height Weight   08/25/20 0335 137/80 97.3 °F (36.3 °C) Oral 87 18 98 % -- --   08/25/20 0334 -- -- -- -- -- -- 6' (1.829 m) 175 lb 12.8 oz (79.7 kg)   08/25/20 0300 126/72 -- -- -- -- -- -- --   08/25/20 0230 (!) 133/98 -- -- -- -- 99 % -- 170 lb (77.1 kg)   08/25/20 0200 139/76 -- -- -- -- -- -- --   08/25/20 0100 (!) 148/91 -- -- 85 18 97 % -- --   08/25/20 0030 (!) 151/82 -- -- -- -- -- -- --       Intake/Output Summary (Last 24 hours) at 8/25/2020 0825  Last data filed at 8/25/2020 0440  Gross per 24 hour   Intake 0 ml   Output --   Net 0 ml       Review of Systems   Constitutional: Negative for fever. Respiratory: Negative for cough and shortness of breath. Cardiovascular: Negative for chest pain. Gastrointestinal: Negative for nausea. Musculoskeletal: Positive for arthralgias, gait problem and joint swelling. Neurological: Negative for dizziness. Physical Exam  Vitals signs reviewed.    Cardiovascular:      Rate and Rhythm: IVDU- Vanc for MRSA coverage  - Orthopedic surgery consulted- arthrocentesis   - Toradol PRN for pain  - Blood culture x2     Hx of Tobacco use  - nicotine patch PRN    Code Status: full  FEN: npo  DISPO: [facility]    Danii Olivera DO, PGY-1  08/25/20  8:25 AM    This patient has been staffed and discussed with Layla Coburn MD.

## 2020-08-25 NOTE — ED PROVIDER NOTES
0.50 packs per day. He has never used smokeless tobacco. He reports current alcohol use. He reports current drug use. Drugs: Cocaine, Marijuana, IV, and Methamphetamines. Medications     Previous Medications    IBUPROFEN (ADVIL;MOTRIN) 800 MG TABLET    Take 1 tablet by mouth every 8 hours as needed for Pain (Take with food)       Allergies     He has No Known Allergies. Physical Exam     INITIAL VITALS: BP: 137/84, Temp: 97.5 °F (36.4 °C), Pulse: 92, Resp: 16, SpO2: 98 %     Physical exam  General: well-appearing, no acute distress  HEENT: no discharge from the eyes or nose. OP clear. Cardiovascular: regular rate and rhythm, no murmurs. Strong pulses in all 4 extremities. Pulmonary: non-labored breathing, normal lung sounds  Abdominal: soft, non-tender, non-distended  Musculoskeletal: no long bone deformity. Patient has pain with ROM of the right ankle but seems to have preserved ROM of the joint. There is ttp over the medial and lateral epicondyles of the right elbow and pain with flexion/extension of the joint. No edema, erythema, or warmth over the right elbow. Extremities: 2+ pitting edema to the right foot and ankle. No edema to the LLE. Skin: dry. There is erythema and warmth over the right foot extending to the mid calf. Neuro: alert, speech and mentation normal, no focal deficits. Sensation intact in all dermatomes. DiagnosticResults       RADIOLOGY:  XR ANKLE RIGHT (MIN 3 VIEWS)   Final Result     Persistent lateral right ankle swelling. No fracture or dislocation. XR ELBOW RIGHT (MIN 3 VIEWS)   Final Result   Cortical irregularity to the right radial head. Favor degenerative    change rather than fracture, particularly given lack of joint effusion.            CT ANKLE RIGHT W CONTRAST    (Results Pending)       LABS:   Results for orders placed or performed during the hospital encounter of 08/24/20   CBC Auto Differential   Result Value Ref Range    WBC 5.5 4.0 - 11.0 K/uL ankle extending to the mid calf. Given patient's presentation, I suspect cellulitis of the right foot or ankle, however given patient's failed outpatient management, it is possible that his findings may be attributable to other pathologies including septic arthritis, gout, or other rheumatologic disease. Labs were obtained and reveal elevated CRP. Blood cultures were also performed given patient's complaint of diffuse body aches and history of IVDU. Orthopedic surgery was consulted and recommends CT of the ankle to further evaluate for acute pathology which may explain his presentation. They will plan to see him in the morning. X-ray of the right elbow was also performed given patient's complaint of pain and possible trauma but did not reveal any evidence of acute fracture or dislocation.      Patient was started on vancomycin. He will be admitted to hospital medicine for further evaluation and management of right ankle and foot cellulitis versus possible septic arthritis. At this time, ESR and CT ankle are pending and will be followed up by the admitting team.     All questions were answered appropriately. Patient verbalized understanding and agreement with the above treatment plan.     This patient was also evaluated by the attending physician. All care plans were discussed and agreed upon. Clinical Impression     1. Cellulitis of right lower extremity        Disposition     PATIENT REFERRED TO:  No follow-up provider specified.     DISCHARGE MEDICATIONS:  New Prescriptions    No medications on file       DISPOSITION Decision To Admit 08/25/2020 02:22:12 AM       Edgar Castellano MD  Resident  08/25/20 3181

## 2020-08-25 NOTE — ED NOTES
Blood Culture #1 drawn from right forearm at 0100  Blood Culture #2 drawn from left forearm at 0110  Site cleaned with Prevantics for 30 seconds and allowed to dry for 30 seconds before cultures were drawn.      Braeden Rob RN  08/25/20 6285

## 2020-08-25 NOTE — ED PROVIDER NOTES
ED Attending Attestation Note     Date of evaluation: 8/24/2020    This patient was seen by the resident. I have seen and examined the patient, agree with the workup, evaluation, management and diagnosis. The care plan has been discussed. My assessment reveals a well-appearing 61-year-old  male with pain and swelling about the right ankle. On examination he has erythema with induration and edema about the right ankle worse on the lateral aspect but continues almost all circumferentially with maybe some clear space just over the medial malleolus. He has some discomfort with range of motion. Is afebrile. Concern for cellulitis which was recalcitrant to outpatient therapy versus a potential septic arthritis receding cellulitis-there is however not a clear area where I would be comfortable performing arthrocentesis through noninfective skin.      Joan Blanca MD  08/25/20 7833

## 2020-08-25 NOTE — PROGRESS NOTES
Pt admitted to room 6310. Pt oriented to room and to call light. Pt AxOx4 and VSS. Pt's admission and skin assessment completed. Pt given non skid socks, bed alarm in place and instructed to call for assistance. Patient verbalized understanding. Will continue to monitor.

## 2020-08-25 NOTE — CONSULTS
Clinical Pharmacy Progress Note    Admit date: 8/24/2020     Pt presented to ED with pain and swelling about the right ankle which is concerning for cellulitis versus possible septic arthritis. Pharmacy is consulted to dose Vancomycin x1 dose in ED per Dr. Lizzy Damon. Wt = 77.1 kg    Assessment/Plan:  · Will order Vancomycin 1500 mg x1 for administration in ED per ER pharmacy consult. · If Vancomycin is to continue on admission and pharmacy is to manage dosing, please re-consult with admission orders.     Thanks--  [employer], [facility]

## 2020-08-26 ENCOUNTER — APPOINTMENT (OUTPATIENT)
Dept: MRI IMAGING | Age: 55
DRG: 558 | End: 2020-08-26
Payer: MEDICAID

## 2020-08-26 LAB
ALBUMIN SERPL-MCNC: 3.7 G/DL (ref 3.4–5)
ANION GAP SERPL CALCULATED.3IONS-SCNC: 7 MMOL/L (ref 3–16)
BASOPHILS ABSOLUTE: 0.1 K/UL (ref 0–0.2)
BASOPHILS RELATIVE PERCENT: 1.2 %
BUN BLDV-MCNC: 12 MG/DL (ref 7–20)
CALCIUM SERPL-MCNC: 8.9 MG/DL (ref 8.3–10.6)
CHLORIDE BLD-SCNC: 100 MMOL/L (ref 99–110)
CO2: 27 MMOL/L (ref 21–32)
CREAT SERPL-MCNC: 0.9 MG/DL (ref 0.9–1.3)
EOSINOPHILS ABSOLUTE: 0.3 K/UL (ref 0–0.6)
EOSINOPHILS RELATIVE PERCENT: 6 %
ESTIMATED AVERAGE GLUCOSE: 119.8 MG/DL
GFR AFRICAN AMERICAN: >60
GFR NON-AFRICAN AMERICAN: >60
GLUCOSE BLD-MCNC: 146 MG/DL (ref 70–99)
HBA1C MFR BLD: 5.8 %
HCT VFR BLD CALC: 42.1 % (ref 40.5–52.5)
HEMOGLOBIN: 14.3 G/DL (ref 13.5–17.5)
LYMPHOCYTES ABSOLUTE: 1.4 K/UL (ref 1–5.1)
LYMPHOCYTES RELATIVE PERCENT: 25.7 %
MCH RBC QN AUTO: 30.2 PG (ref 26–34)
MCHC RBC AUTO-ENTMCNC: 34.1 G/DL (ref 31–36)
MCV RBC AUTO: 88.7 FL (ref 80–100)
MONOCYTES ABSOLUTE: 0.9 K/UL (ref 0–1.3)
MONOCYTES RELATIVE PERCENT: 16.1 %
NEUTROPHILS ABSOLUTE: 2.8 K/UL (ref 1.7–7.7)
NEUTROPHILS RELATIVE PERCENT: 51 %
PDW BLD-RTO: 13.2 % (ref 12.4–15.4)
PHOSPHORUS: 2.9 MG/DL (ref 2.5–4.9)
PLATELET # BLD: 287 K/UL (ref 135–450)
PMV BLD AUTO: 7 FL (ref 5–10.5)
POTASSIUM SERPL-SCNC: 4.1 MMOL/L (ref 3.5–5.1)
RBC # BLD: 4.74 M/UL (ref 4.2–5.9)
SODIUM BLD-SCNC: 134 MMOL/L (ref 136–145)
WBC # BLD: 5.5 K/UL (ref 4–11)

## 2020-08-26 PROCEDURE — 36415 COLL VENOUS BLD VENIPUNCTURE: CPT

## 2020-08-26 PROCEDURE — 73723 MRI JOINT LWR EXTR W/O&W/DYE: CPT

## 2020-08-26 PROCEDURE — 2580000003 HC RX 258: Performed by: STUDENT IN AN ORGANIZED HEALTH CARE EDUCATION/TRAINING PROGRAM

## 2020-08-26 PROCEDURE — 6360000002 HC RX W HCPCS: Performed by: STUDENT IN AN ORGANIZED HEALTH CARE EDUCATION/TRAINING PROGRAM

## 2020-08-26 PROCEDURE — 99232 SBSQ HOSP IP/OBS MODERATE 35: CPT | Performed by: INTERNAL MEDICINE

## 2020-08-26 PROCEDURE — 6360000004 HC RX CONTRAST MEDICATION: Performed by: STUDENT IN AN ORGANIZED HEALTH CARE EDUCATION/TRAINING PROGRAM

## 2020-08-26 PROCEDURE — 85025 COMPLETE CBC W/AUTO DIFF WBC: CPT

## 2020-08-26 PROCEDURE — 80069 RENAL FUNCTION PANEL: CPT

## 2020-08-26 PROCEDURE — 99253 IP/OBS CNSLTJ NEW/EST LOW 45: CPT | Performed by: ORTHOPAEDIC SURGERY

## 2020-08-26 PROCEDURE — 1200000000 HC SEMI PRIVATE

## 2020-08-26 PROCEDURE — 6370000000 HC RX 637 (ALT 250 FOR IP): Performed by: STUDENT IN AN ORGANIZED HEALTH CARE EDUCATION/TRAINING PROGRAM

## 2020-08-26 PROCEDURE — A9579 GAD-BASE MR CONTRAST NOS,1ML: HCPCS | Performed by: STUDENT IN AN ORGANIZED HEALTH CARE EDUCATION/TRAINING PROGRAM

## 2020-08-26 PROCEDURE — 6360000002 HC RX W HCPCS: Performed by: INTERNAL MEDICINE

## 2020-08-26 RX ORDER — NICOTINE 21 MG/24HR
1 PATCH, TRANSDERMAL 24 HOURS TRANSDERMAL DAILY
Qty: 30 PATCH | Refills: 3 | Status: CANCELLED | OUTPATIENT
Start: 2020-08-27

## 2020-08-26 RX ADMIN — KETOROLAC TROMETHAMINE 15 MG: 15 INJECTION, SOLUTION INTRAMUSCULAR; INTRAVENOUS at 05:15

## 2020-08-26 RX ADMIN — ENOXAPARIN SODIUM 40 MG: 40 INJECTION SUBCUTANEOUS at 08:06

## 2020-08-26 RX ADMIN — Medication 10 ML: at 08:06

## 2020-08-26 RX ADMIN — VANCOMYCIN HYDROCHLORIDE 1250 MG: 10 INJECTION, POWDER, LYOPHILIZED, FOR SOLUTION INTRAVENOUS at 14:27

## 2020-08-26 RX ADMIN — GADOTERIDOL 15 ML: 279.3 INJECTION, SOLUTION INTRAVENOUS at 15:28

## 2020-08-26 RX ADMIN — VANCOMYCIN HYDROCHLORIDE 1250 MG: 10 INJECTION, POWDER, LYOPHILIZED, FOR SOLUTION INTRAVENOUS at 01:45

## 2020-08-26 ASSESSMENT — PAIN SCALES - GENERAL
PAINLEVEL_OUTOF10: 0
PAINLEVEL_OUTOF10: 0
PAINLEVEL_OUTOF10: 10
PAINLEVEL_OUTOF10: 0
PAINLEVEL_OUTOF10: 6

## 2020-08-26 ASSESSMENT — ENCOUNTER SYMPTOMS
SHORTNESS OF BREATH: 0
COUGH: 0
NAUSEA: 0

## 2020-08-26 ASSESSMENT — PAIN DESCRIPTION - ONSET: ONSET: ON-GOING

## 2020-08-26 ASSESSMENT — PAIN - FUNCTIONAL ASSESSMENT
PAIN_FUNCTIONAL_ASSESSMENT: ACTIVITIES ARE NOT PREVENTED
PAIN_FUNCTIONAL_ASSESSMENT: ACTIVITIES ARE NOT PREVENTED

## 2020-08-26 ASSESSMENT — PAIN DESCRIPTION - PROGRESSION: CLINICAL_PROGRESSION: NOT CHANGED

## 2020-08-26 ASSESSMENT — PAIN DESCRIPTION - FREQUENCY
FREQUENCY: INTERMITTENT
FREQUENCY: INTERMITTENT

## 2020-08-26 ASSESSMENT — PAIN DESCRIPTION - DESCRIPTORS
DESCRIPTORS: ACHING
DESCRIPTORS: ACHING

## 2020-08-26 ASSESSMENT — PAIN DESCRIPTION - LOCATION
LOCATION: ANKLE
LOCATION: ANKLE

## 2020-08-26 ASSESSMENT — PAIN DESCRIPTION - PAIN TYPE
TYPE: ACUTE PAIN
TYPE: ACUTE PAIN

## 2020-08-26 ASSESSMENT — PAIN DESCRIPTION - ORIENTATION
ORIENTATION: RIGHT
ORIENTATION: RIGHT
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
## 2020-08-26 NOTE — PROGRESS NOTES
Cardiovascular:      Rate and Rhythm: Normal rate and regular rhythm. Heart sounds: No murmur. No gallop. Pulmonary:      Breath sounds: No wheezing. Abdominal:      General: There is no distension. Musculoskeletal:         General: Swelling present. Comments: R ankle swelling   Neurological:      Mental Status: He is alert. LABS:    CBC:   Recent Labs     08/25/20 0118 08/26/20 0617   WBC 5.5 5.5   HGB 13.8 14.3   HCT 40.3* 42.1    287   MCV 87.9 88.7     Renal:    Recent Labs     08/25/20 0118 08/25/20  0549 08/26/20 0617    135* 134*   K 3.8 4.0 4.1    100 100   CO2 25 28 27   BUN 15 13 12   CREATININE 0.8* 0.8* 0.9   GLUCOSE 128* 116* 146*   CALCIUM 9.1 9.0 8.9   PHOS  --  4.0 2.9   ANIONGAP 11 7 7     Hepatic:   Recent Labs     08/25/20 0549 08/26/20 0617   LABALBU 3.8 3.7     Troponin: No results for input(s): TROPONINI in the last 72 hours. BNP: No results for input(s): BNP in the last 72 hours. Lipids: No results for input(s): CHOL, HDL in the last 72 hours. Invalid input(s): LDLCALCU, TRIGLYCERIDE  ABGs:  No results for input(s): PHART, UZD2FDU, PO2ART, RET9YJC, BEART, THGBART, A1YNBRHP, FSL7LHM in the last 72 hours. INR: No results for input(s): INR in the last 72 hours. Lactate: No results for input(s): LACTATE in the last 72 hours. Cultures:  -----------------------------------------------------------------  RAD:   CT ANKLE RIGHT W CONTRAST   Final Result      XR ANKLE RIGHT (MIN 3 VIEWS)   Final Result     Persistent lateral right ankle swelling. No fracture or dislocation. XR ELBOW RIGHT (MIN 3 VIEWS)   Final Result   Cortical irregularity to the right radial head. Favor degenerative    change rather than fracture, particularly given lack of joint effusion.            MRI ANKLE RIGHT W WO CONTRAST    (Results Pending)       Assessment/Plan:     R ankle pain   Concern for septic arthritis vs autoimmune response; Normal ESR, mildly

## 2020-08-26 NOTE — PROGRESS NOTES
ID Follow-up NOTE    CC:   R ankle infection - tenosynoviits / abscesses seen on MRI  Antibiotics: Vancomycin    Admit Date: 2020  Hospital Day: 3    Subjective:     Patient reports pain with weight bearing       Objective:     Patient Vitals for the past 8 hrs:   BP Temp Temp src Pulse Resp SpO2   20 1605 134/88 98.8 °F (37.1 °C) Oral 84 16 99 %     I/O last 3 completed shifts: In: 240 [P.O.:240]  Out: 0   No intake/output data recorded. EXAM:  GENERAL: No apparent distress. HEENT: Membranes moist, no oral lesion  NECK:  Supple, no lymphadenopathy  LUNGS: Clear b/l, no rales, no dullness  CARDIAC: RRR, no murmur appreciated  ABD:  + BS, soft / NT  EXT:  No rash, no edema, no lesions - R ankle / foot swelling, mild warmth  NEURO: No focal neurologic findings  PSYCH: Orientation, sensorium, mood normal  LINES:  Peripheral iv       Data Review:  Lab Results   Component Value Date    WBC 5.5 2020    HGB 14.3 2020    HCT 42.1 2020    MCV 88.7 2020     2020     Lab Results   Component Value Date    CREATININE 0.9 2020    BUN 12 2020     (L) 2020    K 4.1 2020     2020    CO2 27 2020       Hepatic Function Panel:   Lab Results   Component Value Date    LABALBU 3.7 2020       Micro:   BC sent     Imagin/26 R ankle MRI:  Infectious tenosynovitis involving the extensor hallucis longus and extensor digitorum longus tendon sheaths with at least 3 peripherally enhancing abscesses identified.  Marked soft tissue swelling surrounds the affected tendons.         No definite signs of osteomyelitis.         Longitudinal split tear of the inframalleolar peroneus brevis tendon         Mild posterior tibial tendinosis       R ankle CT    IMPRESSION:        Diffuse subcutaneous edema with some more focal muscular edema to the    lower extensor compartment.  2 cm sliver of fluid in this compartment    could reflect a small abscess in the proper clinical setting.  No    findings of osteomyelitis.  No fracture. Scheduled Meds:   sodium chloride flush  10 mL Intravenous 2 times per day    nicotine  1 patch Transdermal Daily    vancomycin  1,250 mg Intravenous Q12H    enoxaparin  40 mg Subcutaneous Daily       Continuous Infusions:      PRN Meds:  sodium chloride flush, acetaminophen **OR** acetaminophen, polyethylene glycol, promethazine **OR** ondansetron, ketorolac      Assessment:     R ankle swelling - improved on vanco, no clincal evidence for septic arthritis. May have had injury and improved since he has been off feet / non-weight bearing for a day. Normal ESR / mild increase in CRP.       May have injury related pain / swelling / hyperemia.      MRI suggests infectious tenosynoviits with poss abscesses    Plan:     Cont vancomycin  Will review MRI  Ortho f/u - question if need debridement     Home on po vs iv antibiotics, has Medicaid product through [address] [place] [address]  Discussed with pt,   Jj Linares MD No Exposure

## 2020-08-26 NOTE — PROGRESS NOTES
Clinical Pharmacy Consult Note     Admit date: 8/24/2020     Subjective/Objective:  11 XM BYPU OMYU PMHx that includes IVDU presented to ED with complaints of R ankle pain which is concerning for cellulitis versus possible septic arthritis.      Pharmacy is consulted to dose Vancomycin per Dr. Glass     Pertinent Medications:  Vancomycin -- Pharmacy to [facility]-- day # 2             1.75 g IV once (8/25)              1.25 g IV q12h (8/25 - current)     Pertinent Labs:    Recent Labs     08/25/20  0549 08/26/20  0617   * 134*   K 4.0 4.1    100   CO2 28 27   PHOS 4.0 2.9   BUN 13 12   CREATININE 0.8* 0.9       Estimated Creatinine Clearance: 103 mL/min (based on SCr of 0.9 mg/dL). Recent Labs     08/25/20  0118 08/26/20  0617   WBC 5.5 5.5   HGB 13.8 14.3   HCT 40.3* 42.1   MCV 87.9 88.7    287       Height:  6' (182.9 cm)  Weight: 175 lb 12.8 oz (79.7 kg)     Micro:  Date Site Micro Susceptibility   8/25 Blood Cx No growth to date                     Assessment/Plan:  1. Cellulitis vs Septic arthritis: Vancomycin --  day # 2  Vancomycin -- Pharmacy to Dose  · Loading dose of 1.75 g IV once yesterday. · No significant changes since yesterday, continue 1.25 g IV q12h. Provides ~ 16 mg/kg and is based on a half-life elimination of 7 hours. · Trough goal 15-20 mcg/mL for septic arthritis. Troughs to be ordered tomorrow before 4th dose. · Clinical pharmacist will follow-up in AM.  · Renal function will be monitored closely and dosing will be adjusted as appropriate.     Please call with any questions.     Thank you for consulting pharmacy!   Harjit Luevano, PharmD  PGY1 Pharmacy Resident   Wireless: [phone]  8/26/2020 8:38 AM

## 2020-08-26 NOTE — CONSULTS
47 yo man with hx of IVDA  Presents with R ankle swelling and pain. Had injured ankle approximately 2 mo ago ('kicked a pole in the ground)  Seen in ED 8/3, dx cellulitis, rx cephalexin + bactrim. No improvement with po antibiotics. Developed worsening redness, pain and swelling over time. Developed weakness, aches over last few day prompting ED visit.     Seen in ED 8/24 in evening - Afeb, WBC 5.5, CRP 8.6, ESR 18  CT with 'sliver of fluid' but no ankle joint swelling. BC sent, started on vancomycin     Today 8/25 - ankle with less redness / swelling     Past Medical History:    Past Medical History   History reviewed. No pertinent past medical history.        Past Surgical History:    Past Surgical History   History reviewed. No pertinent surgical history.        Current Medications:    Scheduled Medications    sodium chloride flush  10 mL Intravenous 2 times per day    nicotine  1 patch Transdermal Daily    [START ON 8/26/2020] vancomycin  1,250 mg Intravenous Q12H           Allergies:  Patient has no known allergies.     Social History:    TOBACCO:                 + cig 1/2 ppd  ETOH:                         Occasional   DRUGS:                      polysub abuse - cocaine, marijuana, methamphetamin  MARITAL STATUS:    Single   OCCUPATION:                Family History:   No immunodeficiency     REVIEW OF SYSTEMS:    No fever / chills / sweats. No weight loss. No visual change, eye pain, eye discharge. No oral lesion, sore throat, dysphagia. Denies cough / sputum. Denies chest pain, palpitations. Denies n / v / abd pain. No diarrhea. Denies dysuria or change in urinary function. Denies joint swelling or pain. No myalgia, arthralgia.   Denies skin changes, itching  Denies focal weakness, sensory change or other neurologic symptom    Denies new / worse depression, psychiatric symptoms  Denies any Endocrine or Hematologic symptoms     PHYSICAL EXAM:       Vitals:    /80   Pulse Telephone Encounter by Jane Saldana at 06/19/18 08:47 AM     Author:  Jane Saldana Service:  (none) Author Type:       Filed:  06/19/18 08:48 AM Encounter Date:  6/19/2018 Status:  Signed     :  Jane Saldana ()            left message recorder[AS1.1T]  To sched MARCELA  Andorran speaking   [phone][AS1.1M]      Revision History        User Key Date/Time User Provider Type Action    > AS1.1 06/19/18 08:48 AM Jane Saldana  Sign    M - Manual, T - Template             102   Temp 97.8 °F (36.6 °C) (Oral)   Resp 18   Ht 6' (1.829 m)   Wt 175 lb 12.8 oz (79.7 kg)   SpO2 98%   BMI 23.84 kg/m²      GENERAL:      No apparent distress. HEENT:           Membranes moist, no oral lesion, PERRL  NECK:             Supple, no lymphadenopaty  LUNGS:           Clear b/l, no rales, no dullness  CARDIAC:       RRR, no murmur appreciated  ABD:                + BS, soft / NT  EXT:                No rash, no edema, no lesions - R ankle swelling, no redness / warmth, excellent motions of the ankle and subtalar joint with no obvious limitations. Swelling present but no obvious localization, just general swelling in the distal leg. No local areas for potential fracture. CT did not isolate any fracture either. NEURO:          No focal neurologic findings  PSYCH:           Orientation, sensorium, mood normal  LINES:             Peripheral iv     DATA:          Lab Results   Component Value Date     WBC 5.5 2020     HGB 13.8 2020     HCT 40.3 (L) 2020     MCV 87.9 2020      2020           Lab Results   Component Value Date     CREATININE 0.8 (L) 2020     BUN 13 2020      (L) 2020     K 4.0 2020      2020     CO2 28 2020        Hepatic Function Panel:         Lab Results   Component Value Date     LABALBU 3.8 2020        Micro:   BC sent     Imagin/24 R ankle CT    IMPRESSION:        Diffuse subcutaneous edema with some more focal muscular edema to the    lower extensor compartment.  2 cm sliver of fluid in this compartment    could reflect a small abscess in the proper clinical setting.  No    findings of osteomyelitis.  No fracture.          IMPRESSION:           Patient Active Problem List   Diagnosis    Septic arthritis (HCC)    Abrasion, ankle with infection, right, sequela        R ankle swelling - improved on vanco, no clincal evidence for septic arthritis.   May have had injury and improved since he has been off feet / non-weight bearing for a day. Normal ESR / mild increase in CRP.       May have injury related pain / swelling / hyperemia.   NOT clear there is an infection at all.

## 2020-08-26 NOTE — DISCHARGE SUMMARY
INTERNAL MEDICINE    RESIDENT DISCHARGE SUMMARY   Discharge Summaries      Patient ID: Jennifer Le   Gender: male      :  1965  AGE: 48 y. o. MRN:  [id number]  Code Status: Full Code  PCP: No primary care provider on file. Admit date:  2020      Discharge date:  No discharge date for patient encounter. Admitting Physician:  Romeo Stern DO    Discharge Physician: Rodrigue Berrios DO     Admission Condition:  good  Discharged Condition:  good Stable    Discharge Diagnoses:  1- Soft tissue infection of the right ankle       Active Hospital Problems    Diagnosis Date Noted    Septic arthritis ([facility] [address] [M00.9] 2020    Abrasion, ankle with infection, right, sequela [S90.511S, L08.9] 2020       The patient was seen and examined on day of discharge and this discharge summary is in conjunction with any daily progress note from day of discharge. Hospital Course:  49 yo M PMH IVDU who presented with R ankle swelling. He had a similar complaint 3 weeks prior in the ED. He had injured his foot on a spike and was treated for cellulitis (cephlex, bactrim for 7 days) however pain did not improve and the ankle swelling worsened. He returned to the ED due to the lack of improvement and limited range of motion. Was treated with 2 days vancomycin. ID was consulted and agreed with vancomycin and then transitioned to po zyvox on discharge. MRI was ordered and revealed Infectious tenosynovitis and least 3 peripherally enhancing abscesses identified. Marked soft tissue swelling surrounds the affected tendons. MRI was negative for osteomyelitis. On date of discharge patient was seen and denied decreased ROM of the R ankle, fever. R ankle swelling had significantly improved. Denied chest pain, shortness of breath, NVD.      Disposition:  Home    Physical Exam Performed:     /78   Pulse 89   Temp 97.8 °F (36.6 °C) (Oral)   Resp 18   Ht 6' (1.829 m)   Wt 175 lb 12.8 oz (79.7 kg)   SpO2 98%   BMI 23.84 kg/m²     Physical Exam  Vitals signs reviewed. Cardiovascular:      Rate and Rhythm: Normal rate and regular rhythm. Heart sounds: No murmur. No friction rub. Pulmonary:      Effort: Pulmonary effort is normal.      Breath sounds: No wheezing. Abdominal:      General: There is no distension. Musculoskeletal:         General: No swelling. Neurological:      General: No focal deficit present. Mental Status: He is alert and oriented to person, place, and time. Cranial Nerves: No cranial nerve deficit. Labs: For convenience and continuity at follow-up the following most recent labs are provided:      CBC:    Lab Results   Component Value Date    WBC 5.3 08/27/2020    HGB 14.4 08/27/2020    HCT 42.2 08/27/2020     08/27/2020       Renal:    Lab Results   Component Value Date     08/27/2020    K 3.9 08/27/2020    K 3.8 08/25/2020     08/27/2020    CO2 27 08/27/2020    BUN 13 08/27/2020    CREATININE 0.8 08/27/2020    CALCIUM 9.1 08/27/2020    PHOS 3.5 08/27/2020         Significant Diagnostic Studies    Radiology:   MRI ANKLE RIGHT W WO CONTRAST   Final Result      Infectious tenosynovitis involving the extensor hallucis longus and extensor digitorum longus tendon sheaths with at least 3 peripherally enhancing abscesses identified. Marked soft tissue swelling surrounds the affected tendons. No definite signs of osteomyelitis. Longitudinal split tear of the inframalleolar peroneus brevis tendon      Mild posterior tibial tendinosis            CT ANKLE RIGHT W CONTRAST   Final Result      XR ANKLE RIGHT (MIN 3 VIEWS)   Final Result     Persistent lateral right ankle swelling. No fracture or dislocation. XR ELBOW RIGHT (MIN 3 VIEWS)   Final Result   Cortical irregularity to the right radial head. Favor degenerative    change rather than fracture, particularly given lack of joint effusion.                   Consults:     IP

## 2020-08-27 VITALS
DIASTOLIC BLOOD PRESSURE: 79 MMHG | HEART RATE: 85 BPM | WEIGHT: 175.8 LBS | TEMPERATURE: 97.3 F | HEIGHT: 72 IN | SYSTOLIC BLOOD PRESSURE: 125 MMHG | BODY MASS INDEX: 23.81 KG/M2 | RESPIRATION RATE: 16 BRPM | OXYGEN SATURATION: 95 %

## 2020-08-27 LAB
ALBUMIN SERPL-MCNC: 3.5 G/DL (ref 3.4–5)
ANION GAP SERPL CALCULATED.3IONS-SCNC: 8 MMOL/L (ref 3–16)
BASOPHILS ABSOLUTE: 0.1 K/UL (ref 0–0.2)
BASOPHILS RELATIVE PERCENT: 1.1 %
BUN BLDV-MCNC: 13 MG/DL (ref 7–20)
CALCIUM SERPL-MCNC: 9.1 MG/DL (ref 8.3–10.6)
CHLORIDE BLD-SCNC: 100 MMOL/L (ref 99–110)
CO2: 27 MMOL/L (ref 21–32)
CREAT SERPL-MCNC: 0.8 MG/DL (ref 0.9–1.3)
EOSINOPHILS ABSOLUTE: 0.3 K/UL (ref 0–0.6)
EOSINOPHILS RELATIVE PERCENT: 6.2 %
GFR AFRICAN AMERICAN: >60
GFR NON-AFRICAN AMERICAN: >60
GLUCOSE BLD-MCNC: 143 MG/DL (ref 70–99)
HCT VFR BLD CALC: 42.2 % (ref 40.5–52.5)
HEMOGLOBIN: 14.4 G/DL (ref 13.5–17.5)
LYMPHOCYTES ABSOLUTE: 1.6 K/UL (ref 1–5.1)
LYMPHOCYTES RELATIVE PERCENT: 29.9 %
MCH RBC QN AUTO: 30.1 PG (ref 26–34)
MCHC RBC AUTO-ENTMCNC: 34.1 G/DL (ref 31–36)
MCV RBC AUTO: 88.2 FL (ref 80–100)
MONOCYTES ABSOLUTE: 0.8 K/UL (ref 0–1.3)
MONOCYTES RELATIVE PERCENT: 15 %
NEUTROPHILS ABSOLUTE: 2.5 K/UL (ref 1.7–7.7)
NEUTROPHILS RELATIVE PERCENT: 47.8 %
PDW BLD-RTO: 13.2 % (ref 12.4–15.4)
PHOSPHORUS: 3.5 MG/DL (ref 2.5–4.9)
PLATELET # BLD: 293 K/UL (ref 135–450)
PMV BLD AUTO: 7 FL (ref 5–10.5)
POTASSIUM SERPL-SCNC: 3.9 MMOL/L (ref 3.5–5.1)
RBC # BLD: 4.78 M/UL (ref 4.2–5.9)
SODIUM BLD-SCNC: 135 MMOL/L (ref 136–145)
VANCOMYCIN TROUGH: 9.7 UG/ML (ref 10–20)
WBC # BLD: 5.3 K/UL (ref 4–11)

## 2020-08-27 PROCEDURE — 2580000003 HC RX 258: Performed by: STUDENT IN AN ORGANIZED HEALTH CARE EDUCATION/TRAINING PROGRAM

## 2020-08-27 PROCEDURE — 6360000002 HC RX W HCPCS: Performed by: INTERNAL MEDICINE

## 2020-08-27 PROCEDURE — 6370000000 HC RX 637 (ALT 250 FOR IP): Performed by: STUDENT IN AN ORGANIZED HEALTH CARE EDUCATION/TRAINING PROGRAM

## 2020-08-27 PROCEDURE — 80069 RENAL FUNCTION PANEL: CPT

## 2020-08-27 PROCEDURE — 36415 COLL VENOUS BLD VENIPUNCTURE: CPT

## 2020-08-27 PROCEDURE — 99232 SBSQ HOSP IP/OBS MODERATE 35: CPT | Performed by: INTERNAL MEDICINE

## 2020-08-27 PROCEDURE — 6360000002 HC RX W HCPCS: Performed by: STUDENT IN AN ORGANIZED HEALTH CARE EDUCATION/TRAINING PROGRAM

## 2020-08-27 PROCEDURE — 85025 COMPLETE CBC W/AUTO DIFF WBC: CPT

## 2020-08-27 PROCEDURE — 80202 ASSAY OF VANCOMYCIN: CPT

## 2020-08-27 RX ORDER — LINEZOLID 600 MG/1
600 TABLET, FILM COATED ORAL 2 TIMES DAILY
Qty: 42 TABLET | Refills: 0 | Status: SHIPPED | OUTPATIENT
Start: 2020-08-27 | End: 2020-09-17

## 2020-08-27 RX ADMIN — Medication 10 ML: at 09:08

## 2020-08-27 RX ADMIN — ENOXAPARIN SODIUM 40 MG: 40 INJECTION SUBCUTANEOUS at 09:08

## 2020-08-27 RX ADMIN — VANCOMYCIN HYDROCHLORIDE 1250 MG: 10 INJECTION, POWDER, LYOPHILIZED, FOR SOLUTION INTRAVENOUS at 01:35

## 2020-08-27 RX ADMIN — KETOROLAC TROMETHAMINE 15 MG: 15 INJECTION, SOLUTION INTRAMUSCULAR; INTRAVENOUS at 01:27

## 2020-08-27 ASSESSMENT — ENCOUNTER SYMPTOMS
NAUSEA: 0
COUGH: 0
SHORTNESS OF BREATH: 0

## 2020-08-27 ASSESSMENT — PAIN DESCRIPTION - ORIENTATION: ORIENTATION: RIGHT

## 2020-08-27 ASSESSMENT — PAIN DESCRIPTION - DESCRIPTORS: DESCRIPTORS: ACHING

## 2020-08-27 ASSESSMENT — PAIN SCALES - GENERAL
PAINLEVEL_OUTOF10: 6
PAINLEVEL_OUTOF10: 0

## 2020-08-27 ASSESSMENT — PAIN DESCRIPTION - LOCATION: LOCATION: LEG

## 2020-08-27 ASSESSMENT — PAIN - FUNCTIONAL ASSESSMENT: PAIN_FUNCTIONAL_ASSESSMENT: ACTIVITIES ARE NOT PREVENTED

## 2020-08-27 ASSESSMENT — PAIN DESCRIPTION - ONSET: ONSET: ON-GOING

## 2020-08-27 ASSESSMENT — PAIN DESCRIPTION - PROGRESSION
CLINICAL_PROGRESSION: NOT CHANGED
CLINICAL_PROGRESSION: NOT CHANGED

## 2020-08-27 ASSESSMENT — PAIN DESCRIPTION - PAIN TYPE: TYPE: ACUTE PAIN

## 2020-08-27 ASSESSMENT — PAIN DESCRIPTION - FREQUENCY: FREQUENCY: INTERMITTENT

## 2020-08-27 NOTE — PROGRESS NOTES
ID Follow-up NOTE    CC:   R ankle infection - tenosynoviits / abscesses seen on MRI  Antibiotics: Vancomycin    Admit Date: 2020  Hospital Day: 4    Subjective:     Patient reports less swelling and less pain with weight bearing       Objective:     Patient Vitals for the past 8 hrs:   BP Temp Temp src Pulse Resp SpO2   20 0911 125/79 97.3 °F (36.3 °C) Oral 85 16 95 %     I/O last 3 completed shifts: In: 240 [P.O.:240]  Out: -   No intake/output data recorded. EXAM:  GENERAL: No apparent distress. HEENT: Membranes moist, no oral lesion  NECK:  Supple, no lymphadenopathy  LUNGS: Clear b/l, no rales, no dullness  CARDIAC: RRR, no murmur appreciated  ABD:  + BS, soft / NT  EXT:  No rash, no edema, no lesions - R ankle / foot swelling, mild warmth  NEURO: No focal neurologic findings  PSYCH: Orientation, sensorium, mood normal  LINES:  Peripheral iv       Data Review:  Lab Results   Component Value Date    WBC 5.3 2020    HGB 14.4 2020    HCT 42.2 2020    MCV 88.2 2020     2020     Lab Results   Component Value Date    CREATININE 0.8 (L) 2020    BUN 13 2020     (L) 2020    K 3.9 2020     2020    CO2 27 2020       Hepatic Function Panel:   Lab Results   Component Value Date    LABALBU 3.5 2020       Micro:   BC sent     Imagin/26 R ankle MRI:  Infectious tenosynovitis involving the extensor hallucis longus and extensor digitorum longus tendon sheaths with at least 3 peripherally enhancing abscesses identified.  Marked soft tissue swelling surrounds the affected tendons.         No definite signs of osteomyelitis.         Longitudinal split tear of the inframalleolar peroneus brevis tendon         Mild posterior tibial tendinosis       R ankle CT    IMPRESSION:        Diffuse subcutaneous edema with some more focal muscular edema to the    lower extensor compartment.  2 cm sliver of fluid in this compartment    could reflect a small abscess in the proper clinical setting.  No    findings of osteomyelitis.  No fracture. Scheduled Meds:   vancomycin  1,500 mg Intravenous Q12H    sodium chloride flush  10 mL Intravenous 2 times per day    nicotine  1 patch Transdermal Daily    enoxaparin  40 mg Subcutaneous Daily       Continuous Infusions:      PRN Meds:  sodium chloride flush, acetaminophen **OR** acetaminophen, polyethylene glycol, promethazine **OR** ondansetron, ketorolac      Assessment:     R ankle swelling - improved on vanco, no clincal evidence for septic arthritis. May have had injury and improved since he has been off feet / non-weight bearing for a day. Normal ESR / mild increase in CRP.       May have injury related pain / swelling / hyperemia.      MRI suggests infectious tenosynovitis with poss abscesses    Plan:     Cont vancomycin  Will review MRI    Home on po linezolid 600 mg po bid x 3 week   - reviewed poss side effects - GI, low plt   - F/u labs in 2 weeks - put in orders in separate order only encounter  F/u [facility]  F/u me PRN     Discussed with pt, RN, Attending - Dr Vidal Márquez MD

## 2020-08-27 NOTE — PROGRESS NOTES
Clinical Pharmacy Consult Note     Admit date: 8/24/2020     Subjective/Objective:  21 CW YNOW PSBI PMHx that includes IVDU presented to ED with complaints of R ankle pain which is concerning for cellulitis versus possible septic arthritis.      Pharmacy is consulted to dose Vancomycin per Dr. Glass     Pertinent Medications:  Vancomycin -- Pharmacy to [facility]-- day # 3             1.75 g IV once (8/25)              1.25 g IV q12h (8/25 - 8/27)      1.5 g IV q12h (8/27 - current)     Pertinent Labs:    Recent Labs     08/26/20  0617 08/27/20  0638   * 135*   K 4.1 3.9    100   CO2 27 27   PHOS 2.9 3.5   BUN 12 13   CREATININE 0.9 0.8*       Estimated Creatinine Clearance: 116 mL/min (A) (based on SCr of 0.8 mg/dL (L)). Recent Labs     08/26/20  0617 08/27/20  0638   WBC 5.5 5.3   HGB 14.3 14.4   HCT 42.1 42.2   MCV 88.7 88.2    293       Height:  6' (182.9 cm)  Weight: 175 lb 12.8 oz (79.7 kg)     Micro:  Date Site Micro Susceptibility   8/25 Blood Cx No growth to date                     Assessment/Plan:  1. Cellulitis vs Septic arthritis: Vancomycin --  day # 3  Vancomycin -- Pharmacy to Dose  · Loading dose of 1.75 g IV once 8/25. · Trough = 9.7 mcg/mL, drawn ~10 hours after dose. MRI ankle showed no evidence of septic arthritis. Will lower trough goal to 10-15 mcg/mL for cellulitis. Trough below goal. Increase dose to 1.5 g IV q12h. · Troughs will be ordered as appropriate. · Clinical pharmacist will follow-up in AM.  · Renal function will be monitored closely and dosing will be adjusted as appropriate.     Please call with any questions.     Thank you for consulting pharmacy!   Katina Calzada, PharmD  PGY1 Pharmacy Resident   Wireless: [phone]  8/27/2020 8:39 AM

## 2020-08-27 NOTE — PROGRESS NOTES
Progress Note    Admit Date: 2020  Day: 3  Diet: DIET GENERAL;    CC: Soft tissue infection of R ankle    Interval history: Patient seen at bedside. No acute events overnight. Denies ankle or foot pain, denies fever, chest pain, shortness of breath, abdominal pain. HPI: 49 yo M with limited PMH IVDU presented with R ankle monoarthritis, CT- diffuse subcutaneous edema with focal muscular edema and 2 cm cell vs. Fluid poss abscess. Failed joint aspiration in the ED. Surgery on board for I&D. Medications:     Scheduled Meds:   vancomycin  1,500 mg Intravenous Q12H    sodium chloride flush  10 mL Intravenous 2 times per day    nicotine  1 patch Transdermal Daily    enoxaparin  40 mg Subcutaneous Daily     Continuous Infusions:  PRN Meds:sodium chloride flush, acetaminophen **OR** acetaminophen, polyethylene glycol, promethazine **OR** ondansetron, ketorolac    Objective:   Vitals:   T-max: Temp (24hrs), Av °F (36.7 °C), Min:97.3 °F (36.3 °C), Max:98.8 °F (37.1 °C)      Patient Vitals for the past 8 hrs:   BP Temp Temp src Pulse Resp SpO2   20 0911 125/79 97.3 °F (36.3 °C) Oral 85 16 95 %   20 0123 134/81 98 °F (36.7 °C) Oral 97 16 96 %       Intake/Output Summary (Last 24 hours) at 2020 0919  Last data filed at 2020 0123  Gross per 24 hour   Intake 240 ml   Output --   Net 240 ml       Review of Systems   Constitutional: Negative for fever. Respiratory: Negative for cough and shortness of breath. Cardiovascular: Negative for chest pain. Gastrointestinal: Negative for nausea. Musculoskeletal: Negative for arthralgias, gait problem and joint swelling. Neurological: Negative for dizziness. Physical Exam  Vitals signs reviewed. Cardiovascular:      Rate and Rhythm: Normal rate and regular rhythm. Heart sounds: No murmur. No gallop. Pulmonary:      Breath sounds: No wheezing. Abdominal:      General: There is no distension.    Musculoskeletal: General: No swelling. Comments: R ankle swelling   Neurological:      Mental Status: He is alert. LABS:    CBC:   Recent Labs     08/25/20  0118 08/26/20  0617 08/27/20  0638   WBC 5.5 5.5 5.3   HGB 13.8 14.3 14.4   HCT 40.3* 42.1 42.2    287 293   MCV 87.9 88.7 88.2     Renal:    Recent Labs     08/25/20  0549 08/26/20  0617 08/27/20  0638   * 134* 135*   K 4.0 4.1 3.9    100 100   CO2 28 27 27   BUN 13 12 13   CREATININE 0.8* 0.9 0.8*   GLUCOSE 116* 146* 143*   CALCIUM 9.0 8.9 9.1   PHOS 4.0 2.9 3.5   ANIONGAP 7 7 8     Hepatic:   Recent Labs     08/25/20  0549 08/26/20  0617 08/27/20  0638   LABALBU 3.8 3.7 3.5     Troponin: No results for input(s): TROPONINI in the last 72 hours. BNP: No results for input(s): BNP in the last 72 hours. Lipids: No results for input(s): CHOL, HDL in the last 72 hours. Invalid input(s): LDLCALCU, TRIGLYCERIDE  ABGs:  No results for input(s): PHART, EVP8XGK, PO2ART, XFZ4TCF, BEART, THGBART, K0UKZHNN, DXK7RRB in the last 72 hours. INR: No results for input(s): INR in the last 72 hours. Lactate: No results for input(s): LACTATE in the last 72 hours. Cultures:  -----------------------------------------------------------------  RAD:   MRI ANKLE RIGHT W WO CONTRAST   Final Result      Infectious tenosynovitis involving the extensor hallucis longus and extensor digitorum longus tendon sheaths with at least 3 peripherally enhancing abscesses identified. Marked soft tissue swelling surrounds the affected tendons. No definite signs of osteomyelitis. Longitudinal split tear of the inframalleolar peroneus brevis tendon      Mild posterior tibial tendinosis            CT ANKLE RIGHT W CONTRAST   Final Result      XR ANKLE RIGHT (MIN 3 VIEWS)   Final Result     Persistent lateral right ankle swelling. No fracture or dislocation. XR ELBOW RIGHT (MIN 3 VIEWS)   Final Result   Cortical irregularity to the right radial head.   Lemuel Valdez degenerative    change rather than fracture, particularly given lack of joint effusion. Assessment/Plan:     R ankle pain   Concern for septic arthritis vs autoimmune response; Normal ESR, mildly elevated CRP, failure of outpatient Abx therapy. MRI revealed Infectious tenosynovitis involving the extensor hallucis longus and extensor digitorum longus tendon sheaths with at least 3 peripherally enhancing abscesses identified. Marked soft tissue swelling surrounds the affected tendons.  Negative osteomyelitis  - discharge on oral zyvox for 2 weeks     Hx of Tobacco use  - nicotine patch PRN    Code Status: full  FEN: diet gen  DISPO: dc home    Neil Pettit DO, PGY-1  08/27/20  9:19 AM    This patient has been staffed and discussed with Kiana Garland MD.

## 2020-08-27 NOTE — CARE COORDINATION
Case Management Assessment            Discharge Note    Date / Time of Note: 8/27/2020 9:18 AM  Discharge Note Completed by: Alejandro Kaiser    Patient discharged to home with PO ABX RX meds to beds. Voucher provided by  for RX for [employer] does not cover ABX. Friend will transport patient at d/c. Patient Name: Todd Herman   YOB: 1965  Diagnosis: Septic arthritis ([facility] [address] [M00.9]  Septic arthritis ([facility] [address] [M00.9]  Abrasion, ankle with infection, right, sequela [S90.511S, L08.9]   Date / Time: 8/24/2020 11:33 PM    Current PCP: No primary care provider on file. Clinic patient: No    Hospitalization in the last 30 days: No    Advance Directives:  Code Status: Full Code  [place] DNR form completed and on chart: No    Financial:  Payor: Selma Serra / Plan: Razia Montalvo / Product Type: *No Product type* /      Pharmacy:    [facility], [address] [place] [address]  Phone: [phone] Fax: [phone]      Assistance purchasing medications?: Potential Assistance Purchasing Medications: No  Assistance provided by Case Management: None at this time    Does patient want to participate in local refill/ meds to beds program?: Yes    Meds To Beds General Rules:  1. Can ONLY be done Monday- Friday between 8:30am-5pm  2. Prescription(s) must be in pharmacy by 3pm to be filled same day  3. Copy of patient's insurance/ prescription drug card and patient face sheet must be sent along with the prescription(s)  4. Cost of Rx cannot be added to hospital bill. If financial assistance is needed, please contact unit  or ;  or  CANNOT provide pharmacy voucher for patients co-pays  5.  Patients can then  the prescription on their way out of the hospital at discharge, or pharmacy can deliver to the bedside if staff is available. (payment due at time of pick-up or delivery - cash, check, or card accepted)     Able to afford home medications/ co-pay costs: Yes      DISCHARGE Disposition: Home- No Services Needed    LOC at discharge: Not Applicable  SANDEE Completed: No    Notification completed in HENS/PAS?:  Not Applicable    IMM Completed:   Not Indicated    Transportation:  Transportation PLAN for discharge: friend   Mode of Transport: Private Car    Cecille Kunz and his family were provided with choice of provider; he and his family are in agreement with the discharge plan.     Care Transitions patient: No    ADRIANA Ayers, [facility]  Case Management Department  Ph: [phone]  Fax: [phone]

## 2020-08-27 NOTE — PLAN OF CARE
Problem: Pain:  Goal: Pain level will decrease  Outcome: Ongoing   patient leg pain is controlled w prn pain meds. Pt is educated to elevated affected leg on pillow and partial weight bearing as needed when transfering. Problem: Falls - Risk of:  Goal: Will remain free from falls  Outcome: Ongoing   Patient is free from falls, patient w steady gait, bed in lowest position, bed wheels locked, call light and bed table within reach, bathroom checked q2hrs and PRN. Chair wheels locked. Patient is educated to call for assistance.  Will continue to monitor

## 2020-08-29 LAB
BLOOD CULTURE, ROUTINE: NORMAL
CULTURE, BLOOD 2: NORMAL

## 2020-11-10 ENCOUNTER — APPOINTMENT (OUTPATIENT)
Dept: GENERAL RADIOLOGY | Age: 55
End: 2020-11-10
Payer: MEDICAID

## 2020-11-10 ENCOUNTER — HOSPITAL ENCOUNTER (EMERGENCY)
Age: 55
Discharge: HOME | End: 2020-11-10
Attending: EMERGENCY MEDICINE
Payer: MEDICAID

## 2020-11-10 VITALS
HEART RATE: 98 BPM | DIASTOLIC BLOOD PRESSURE: 78 MMHG | SYSTOLIC BLOOD PRESSURE: 138 MMHG | BODY MASS INDEX: 22.4 KG/M2 | RESPIRATION RATE: 18 BRPM | TEMPERATURE: 98.4 F | HEIGHT: 71 IN | OXYGEN SATURATION: 100 % | WEIGHT: 160 LBS

## 2020-11-10 LAB
EKG ATRIAL RATE: 90 BPM
EKG DIAGNOSIS: NORMAL
EKG P AXIS: 73 DEGREES
EKG P-R INTERVAL: 190 MS
EKG Q-T INTERVAL: 374 MS
EKG QRS DURATION: 96 MS
EKG QTC CALCULATION (BAZETT): 457 MS
EKG R AXIS: 80 DEGREES
EKG T AXIS: 74 DEGREES
EKG VENTRICULAR RATE: 90 BPM

## 2020-11-10 PROCEDURE — 6370000000 HC RX 637 (ALT 250 FOR IP): Performed by: STUDENT IN AN ORGANIZED HEALTH CARE EDUCATION/TRAINING PROGRAM

## 2020-11-10 PROCEDURE — 93005 ELECTROCARDIOGRAM TRACING: CPT | Performed by: STUDENT IN AN ORGANIZED HEALTH CARE EDUCATION/TRAINING PROGRAM

## 2020-11-10 PROCEDURE — 73030 X-RAY EXAM OF SHOULDER: CPT

## 2020-11-10 PROCEDURE — 99283 EMERGENCY DEPT VISIT LOW MDM: CPT

## 2020-11-10 PROCEDURE — 73080 X-RAY EXAM OF ELBOW: CPT

## 2020-11-10 RX ORDER — METHOCARBAMOL 500 MG/1
500 TABLET, FILM COATED ORAL 3 TIMES DAILY
Qty: 15 TABLET | Refills: 0 | Status: SHIPPED | OUTPATIENT
Start: 2020-11-10 | End: 2020-11-15

## 2020-11-10 RX ORDER — LIDOCAINE 4 G/G
1 PATCH TOPICAL DAILY
Status: DISCONTINUED | OUTPATIENT
Start: 2020-11-10 | End: 2020-11-10 | Stop reason: HOSPADM

## 2020-11-10 RX ORDER — IBUPROFEN 600 MG/1
600 TABLET ORAL EVERY 8 HOURS PRN
Qty: 40 TABLET | Refills: 0 | Status: SHIPPED | OUTPATIENT
Start: 2020-11-10

## 2020-11-10 RX ORDER — LIDOCAINE 50 MG/G
1 PATCH TOPICAL DAILY
Qty: 30 PATCH | Refills: 0 | Status: SHIPPED | OUTPATIENT
Start: 2020-11-10 | End: 2020-12-10

## 2020-11-10 RX ORDER — IBUPROFEN 400 MG/1
800 TABLET ORAL ONCE
Status: COMPLETED | OUTPATIENT
Start: 2020-11-10 | End: 2020-11-10

## 2020-11-10 RX ORDER — METHOCARBAMOL 500 MG/1
500 TABLET, FILM COATED ORAL 4 TIMES DAILY
Status: DISCONTINUED | OUTPATIENT
Start: 2020-11-10 | End: 2020-11-10 | Stop reason: HOSPADM

## 2020-11-10 RX ADMIN — METHOCARBAMOL 500 MG: 500 TABLET ORAL at 17:52

## 2020-11-10 RX ADMIN — IBUPROFEN 800 MG: 400 TABLET, FILM COATED ORAL at 17:51

## 2020-11-10 ASSESSMENT — PAIN SCALES - GENERAL
PAINLEVEL_OUTOF10: 10

## 2020-11-10 ASSESSMENT — PAIN DESCRIPTION - PAIN TYPE: TYPE: ACUTE PAIN

## 2020-11-10 ASSESSMENT — PAIN DESCRIPTION - DESCRIPTORS: DESCRIPTORS: SHOOTING

## 2020-11-10 ASSESSMENT — ENCOUNTER SYMPTOMS
SHORTNESS OF BREATH: 0
VOMITING: 0
ABDOMINAL PAIN: 0
NAUSEA: 0

## 2020-11-10 ASSESSMENT — PAIN DESCRIPTION - FREQUENCY
FREQUENCY: CONTINUOUS
FREQUENCY: CONTINUOUS

## 2020-11-10 ASSESSMENT — PAIN DESCRIPTION - LOCATION: LOCATION: SHOULDER

## 2020-11-10 ASSESSMENT — PAIN DESCRIPTION - ORIENTATION: ORIENTATION: RIGHT
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
## 2020-11-10 NOTE — ED PROVIDER NOTES
[address] RESIDENT NOTE       Date of evaluation: 11/10/2020    Chief Complaint     Shoulder Pain (when patient raises or lowers right arm it sends \"shockwaves\" through his body)      History of Present Illness     Marlin Ramirez is a 48 y.o. male PMH substance abuse, PTSD, anxiety who presents with right shoulder pain. Patient has 3 months of gradually worsening right shoulder and elbow pain. The pain is worse when he tries to move his arm above his head, close to his body, or extend his elbow. He feels \"shock waves\" going through his body. He was told he has degenerative joint disease. He sometimes has bilateral knee pain as well, but does not currently have the pain. He is coming in today because he was recently released from detention and has not yet been seen for this problem. He \"was in a cramped cell 24/7 and was not able to move around. \"     Review of Systems     Review of Systems   Constitutional: Negative for fatigue and fever. HENT: Negative for congestion. Respiratory: Negative for shortness of breath. Cardiovascular: Negative for chest pain. Gastrointestinal: Negative for abdominal pain, nausea and vomiting. Genitourinary: Negative for difficulty urinating. Musculoskeletal: Positive for arthralgias and gait problem. Negative for joint swelling. Neurological: Negative for dizziness, light-headedness and numbness. Past Medical, Surgical, Family, and Social History     He has a past medical history of Anxiety, Drug abuse ([facility] [address] and PTSD (post-traumatic stress disorder). He has no past surgical history on file. His family history is not on file. He reports that he has been smoking cigarettes. He has been smoking about 0.50 packs per day. He has never used smokeless tobacco. He reports current alcohol use. He reports previous drug use. Drugs: Marijuana, Methamphetamines, IV, and Cocaine.     Medications     Previous Medications IBUPROFEN (ADVIL;MOTRIN) 800 MG TABLET    Take 1 tablet by mouth every 8 hours as needed for Pain (Take with food)       Allergies     He has No Known Allergies. Physical Exam     INITIAL VITALS: BP: (!) 148/86, Temp: 98.4 °F (36.9 °C), Pulse: 99, Resp: 18, SpO2: 100 %   Physical Exam  Constitutional:       Appearance: Normal appearance. HENT:      Head: Normocephalic and atraumatic. Right Ear: External ear normal.      Left Ear: External ear normal.      Nose: Nose normal.      Mouth/Throat:      Mouth: Mucous membranes are moist.      Pharynx: Oropharynx is clear. Eyes:      Extraocular Movements: Extraocular movements intact. Conjunctiva/sclera: Conjunctivae normal.   Neck:      Musculoskeletal: Normal range of motion and neck supple. Cardiovascular:      Rate and Rhythm: Normal rate and regular rhythm. Pulses: Normal pulses. Pulmonary:      Effort: Pulmonary effort is normal. No respiratory distress. Breath sounds: Normal breath sounds. Abdominal:      General: Abdomen is flat. There is no distension. Palpations: Abdomen is soft. Tenderness: There is no abdominal tenderness. Musculoskeletal:         General: No deformity or signs of injury. Comments: LUE full ROM and 5/5 strength throughout  RUE: shoulder with 90 degree active flexion and 60 degree abduction. Patient unable to full adduct. 4/5 strength shoulder. Passive ROM to 100 degree shoulder flexion and 90 degree abduction. Elbow: 5/5 strength flexion and extesnion. 100 degree active extension, full flexion. Passive full extension. Full ROM of wrist and hand. BLE 5/5 strength straight leg raise, hip flexion, extension, knee flexion, extension, ankle plantar and dorsoflexion. Wiggles toes. Skin:     General: Skin is warm and dry. Capillary Refill: Capillary refill takes less than 2 seconds. Neurological:      General: No focal deficit present.       Mental Status: He is alert and oriented to person, place, and time. Psychiatric:         Mood and Affect: Mood normal.         Behavior: Behavior normal.         DiagnosticResults     EKG   Interpreted in conjunction with emergencydepartment physician Naina Li MD  Rhythm: normal sinus   Rate: normal  Axis: normal  Ectopy: none  Conduction: normal  ST Segments: no acute change  T Waves:no acute change  Q Waves: none  Clinical Impression: no acute changes      RADIOLOGY:  XR ELBOW RIGHT (MIN 3 VIEWS)   Final Result      Right shoulder: 3 views demonstrate no fracture or dislocation. No significant arthritis. Right elbow: 3 views demonstrate no abnormality. XR SHOULDER RIGHT (MIN 2 VIEWS)   Final Result      Right shoulder: 3 views demonstrate no fracture or dislocation. No significant arthritis. Right elbow: 3 views demonstrate no abnormality. LABS:   No results found for this visit on 11/10/20. ED BEDSIDE ULTRASOUND:  none    RECENT VITALS:  BP: (!) 148/86, Temp: 98.4 °F (36.9 °C), Pulse: 99,Resp: 18, SpO2: 100 %     Procedures     none    ED Course     Nursing Notes, Past Medical Hx, Past Surgical Hx, Social Hx, Allergies, and Family Hx were reviewed. The patient was given the followingmedications:  No orders of the defined types were placed in this encounter. CONSULTS:  None    MEDICAL DECISION MAKING / ASSESSMENT / PLAN     Bárbara Jacques is a 48 y.o. male presenting for right shoulder pain x 3 months. Vital signs stable. EKG shows normal sinus rhythm. Right shoulder and elbow x-rays show no acute abnormalities. Patient given lidoderm patch, robaxin and ibuprofen for pain. Patient given prescriptions for these medications and instructed to follow-up with a primary care physician. Patient given referral for [facility]. At this time, patient deemed appropriate for discharge. All of patient's questions answered. Patient given return precautions. Patient discharged in stable condition.        This patient was also evaluated by the attending physician. All care plans werediscussed and agreed upon. Clinical Impression     1. Chronic right shoulder pain        Disposition     PATIENT REFERRED TO:  [facility]  [address] [facility]  [phone]    Schedule an appointment as soon as possible for a visit       [facility] Emergency Department  [address]  [phone]    If symptoms worsen, As needed      DISCHARGE MEDICATIONS:  New Prescriptions    IBUPROFEN (ADVIL;MOTRIN) 600 MG TABLET    Take 1 tablet by mouth every 8 hours as needed for Pain Please take with food. LIDOCAINE (LIDODERM) 5 %    Place 1 patch onto the skin daily 12 hours on, 12 hours off.     METHOCARBAMOL (ROBAXIN) 500 MG TABLET    Take 1 tablet by mouth 3 times daily for 5 days       DISPOSITION  discharge        Rufina Baumgarten, MD  Resident  11/10/20 5609

## 2020-11-10 NOTE — ED NOTES
Bed: A11-11  Expected date:   Expected time:   Means of arrival:   Comments:  Dale Robin RN  11/10/20 5925

## 2021-05-30 ENCOUNTER — APPOINTMENT (OUTPATIENT)
Dept: GENERAL RADIOLOGY | Age: 56
End: 2021-05-30
Payer: MEDICAID

## 2021-05-30 ENCOUNTER — HOSPITAL ENCOUNTER (EMERGENCY)
Age: 56
Discharge: LEFT BEFORE DISCHARGE | End: 2021-05-30
Attending: STUDENT IN AN ORGANIZED HEALTH CARE EDUCATION/TRAINING PROGRAM
Payer: MEDICAID

## 2021-05-30 VITALS
RESPIRATION RATE: 20 BRPM | HEART RATE: 89 BPM | TEMPERATURE: 97.7 F | OXYGEN SATURATION: 100 % | DIASTOLIC BLOOD PRESSURE: 88 MMHG | SYSTOLIC BLOOD PRESSURE: 140 MMHG

## 2021-05-30 DIAGNOSIS — R07.9 CHEST PAIN, UNSPECIFIED TYPE: Primary | ICD-10-CM

## 2021-05-30 LAB
A/G RATIO: 1.2 (ref 1.1–2.2)
ALBUMIN SERPL-MCNC: 4.1 G/DL (ref 3.4–5)
ALP BLD-CCNC: 98 U/L (ref 40–129)
ALT SERPL-CCNC: 52 U/L (ref 10–40)
ANION GAP SERPL CALCULATED.3IONS-SCNC: 9 MMOL/L (ref 3–16)
AST SERPL-CCNC: 33 U/L (ref 15–37)
BASE EXCESS VENOUS: 3.9 MMOL/L (ref -2–3)
BASOPHILS ABSOLUTE: 0.1 K/UL (ref 0–0.2)
BASOPHILS RELATIVE PERCENT: 1.4 %
BILIRUB SERPL-MCNC: 0.4 MG/DL (ref 0–1)
BUN BLDV-MCNC: 14 MG/DL (ref 7–20)
CALCIUM SERPL-MCNC: 9.3 MG/DL (ref 8.3–10.6)
CARBOXYHEMOGLOBIN: 2 % (ref 0–1.5)
CHLORIDE BLD-SCNC: 102 MMOL/L (ref 99–110)
CO2: 24 MMOL/L (ref 21–32)
CREAT SERPL-MCNC: 0.9 MG/DL (ref 0.9–1.3)
D DIMER: <200 NG/ML DDU (ref 0–229)
EOSINOPHILS ABSOLUTE: 0.2 K/UL (ref 0–0.6)
EOSINOPHILS RELATIVE PERCENT: 3.8 %
GFR AFRICAN AMERICAN: >60
GFR NON-AFRICAN AMERICAN: >60
GLOBULIN: 3.3 G/DL
GLUCOSE BLD-MCNC: 100 MG/DL (ref 70–99)
HCO3 VENOUS: 30.3 MMOL/L (ref 24–28)
HCT VFR BLD CALC: 45.3 % (ref 40.5–52.5)
HEMOGLOBIN, VEN, REDUCED: 23.9 %
HEMOGLOBIN: 15.5 G/DL (ref 13.5–17.5)
LIPASE: 19 U/L (ref 13–60)
LYMPHOCYTES ABSOLUTE: 1.4 K/UL (ref 1–5.1)
LYMPHOCYTES RELATIVE PERCENT: 29 %
MCH RBC QN AUTO: 30.5 PG (ref 26–34)
MCHC RBC AUTO-ENTMCNC: 34.1 G/DL (ref 31–36)
MCV RBC AUTO: 89.3 FL (ref 80–100)
METHEMOGLOBIN VENOUS: 0.2 % (ref 0–1.5)
MONOCYTES ABSOLUTE: 0.7 K/UL (ref 0–1.3)
MONOCYTES RELATIVE PERCENT: 13.7 %
NEUTROPHILS ABSOLUTE: 2.5 K/UL (ref 1.7–7.7)
NEUTROPHILS RELATIVE PERCENT: 52.1 %
O2 SAT, VEN: 76 %
PCO2, VEN: 50.7 MMHG (ref 41–51)
PDW BLD-RTO: 13.5 % (ref 12.4–15.4)
PH VENOUS: 7.38 (ref 7.35–7.45)
PLATELET # BLD: 278 K/UL (ref 135–450)
PMV BLD AUTO: 7.4 FL (ref 5–10.5)
PO2, VEN: 41.4 MMHG (ref 25–40)
POTASSIUM REFLEX MAGNESIUM: 4 MMOL/L (ref 3.5–5.1)
RBC # BLD: 5.07 M/UL (ref 4.2–5.9)
SODIUM BLD-SCNC: 135 MMOL/L (ref 136–145)
TCO2 CALC VENOUS: 32 MMOL/L
TOTAL PROTEIN: 7.4 G/DL (ref 6.4–8.2)
TROPONIN: <0.01 NG/ML
WBC # BLD: 4.8 K/UL (ref 4–11)

## 2021-05-30 PROCEDURE — 93005 ELECTROCARDIOGRAM TRACING: CPT | Performed by: PHYSICIAN ASSISTANT

## 2021-05-30 PROCEDURE — 36415 COLL VENOUS BLD VENIPUNCTURE: CPT

## 2021-05-30 PROCEDURE — 71045 X-RAY EXAM CHEST 1 VIEW: CPT

## 2021-05-30 PROCEDURE — 80053 COMPREHEN METABOLIC PANEL: CPT

## 2021-05-30 PROCEDURE — 84484 ASSAY OF TROPONIN QUANT: CPT

## 2021-05-30 PROCEDURE — 82803 BLOOD GASES ANY COMBINATION: CPT

## 2021-05-30 PROCEDURE — 83690 ASSAY OF LIPASE: CPT

## 2021-05-30 PROCEDURE — 85379 FIBRIN DEGRADATION QUANT: CPT

## 2021-05-30 PROCEDURE — 99284 EMERGENCY DEPT VISIT MOD MDM: CPT

## 2021-05-30 PROCEDURE — 85025 COMPLETE CBC W/AUTO DIFF WBC: CPT

## 2021-05-30 ASSESSMENT — ENCOUNTER SYMPTOMS
VOMITING: 1
NAUSEA: 1
COUGH: 1
EYE REDNESS: 0
SHORTNESS OF BREATH: 1
ABDOMINAL PAIN: 0

## 2021-05-30 NOTE — ED PROVIDER NOTES
[address] ENCOUNTER          PHYSICIAN ASSISTANT NOTE       Date of evaluation: 5/30/2021    Chief Complaint     Dizziness      History of Present Illness     Soco Rivero is a 55 y.o. male with PMH of Depression, Anxiety, PTSD, IVDU (meth) who presents with chest pain. Patient reports that he has been experiencing intermittent sharp left sided pleuritic chest pains \"for years\" now. He additionally endorses shortness of breath with exertion. He reports that these symptoms get worse from time to time, especially with stress. Patient reports that police were called on him today for trespassing and he believes this exacerbated his symptoms. He denies associated fevers, chills. He reports unchanged smoker's cough. He has been nauseated but denies vomiting. No abdominal pain or change in bowel or urinary habits. He does describe a gradual onset generalized headache and lightheadedness, typical of headaches patient has experienced in the past. He reports that headaches are typically triggered by shallow breathing. Patient last injected meth last night. He denies other substance use. No recent alcohol intake. Review of Systems     Review of Systems   Constitutional: Negative for chills and fever. HENT: Negative for congestion. Eyes: Negative for redness. Respiratory: Positive for cough (smoker's cough, unchanged) and shortness of breath. Cardiovascular: Positive for chest pain. Gastrointestinal: Positive for nausea and vomiting (x1 one week ago). Negative for abdominal pain. Genitourinary: Negative for difficulty urinating and dysuria. Musculoskeletal: Negative for gait problem. Skin: Negative for wound. Neurological: Positive for dizziness and headaches. Negative for weakness and numbness. Psychiatric/Behavioral: The patient is not nervous/anxious.         Past Medical, Surgical, Family, and Social History     He has a past medical history of Anxiety, Drug normal  Axis: normal  Ectopy: none  Conduction: normal  ST Segments: no acute change  T Waves:hyperacute in  v2, v3 and v4  Q Waves: none  Clinical Impression: no acute changes  Comparison:  11/10/2020    RADIOLOGY:  XR CHEST PORTABLE   Final Result      No focal consolidation or significant edema. Faint nodular opacity in the right upper lung which could represent a pulmonary nodule. Non-emergent chest CT could be obtained on an outpatient basis for further evaluation. LABS:   Results for orders placed or performed during the hospital encounter of 05/30/21   CBC Auto Differential   Result Value Ref Range    WBC 4.8 4.0 - 11.0 K/uL    RBC 5.07 4.20 - 5.90 M/uL    Hemoglobin 15.5 13.5 - 17.5 g/dL    Hematocrit 45.3 40.5 - 52.5 %    MCV 89.3 80.0 - 100.0 fL    MCH 30.5 26.0 - 34.0 pg    MCHC 34.1 31.0 - 36.0 g/dL    RDW 13.5 12.4 - 15.4 %    Platelets 904 452 - 054 K/uL    MPV 7.4 5.0 - 10.5 fL    Neutrophils % 52.1 %    Lymphocytes % 29.0 %    Monocytes % 13.7 %    Eosinophils % 3.8 %    Basophils % 1.4 %    Neutrophils Absolute 2.5 1.7 - 7.7 K/uL    Lymphocytes Absolute 1.4 1.0 - 5.1 K/uL    Monocytes Absolute 0.7 0.0 - 1.3 K/uL    Eosinophils Absolute 0.2 0.0 - 0.6 K/uL    Basophils Absolute 0.1 0.0 - 0.2 K/uL   Blood gas, venous (Lab)   Result Value Ref Range    pH, Neftali 7.385 7.350 - 7.450    pCO2, Neftali 50.7 41.0 - 51.0 mmHg    pO2, Neftali 41.4 (H) 25 - 40 mmHg    HCO3, Venous 30.3 (H) 24.0 - 28.0 mmol/L    Base Excess, Neftali 3.9 (H) -2.0 - 3.0 mmol/L    O2 Sat, Neftali 76 Not established %    Carboxyhemoglobin 2.0 (H) 0.0 - 1.5 %    MetHgb, Neftali 0.2 0.0 - 1.5 %    TC02 (Calc), Neftali 32 mmol/L    Hemoglobin, Neftali, Reduced 23.90 %       RECENT VITALS:  BP: (!) 140/88, Temp: 97.7 °F (36.5 °C), Pulse: 89,Resp: 20, SpO2: 100 %     Procedures         ED Course     Nursing Notes, Past Medical Hx, Past Surgical Hx, Social Hx, Allergies, and Family Hx were reviewed.     The patient was given the followingmedications:  No orders of the defined types were placed in this encounter. CONSULTS:  None    MEDICAL DECISION MAKING / ASSESSMENT / PLAN     Aurea Lopez is a 55 y.o. male with PMH of Depression, Anxiety, PTSD, IVDU (meth) who presents with pleuritic chest pain, FOX present per patient for years. Not significantly changed today. Patient also endorsing gradual onset generalized headache, lightheadedness and nausea. Physical exam revealed a 50yo male in no acute distress. BP elevated at 140/88 without other vital sign abnormality. Heart rhythm regular without murmurs appreciated. Lungs clear. Abdomen soft and non-tender. No focal neurologic deficit. In evaluation of patient's chest discomfort, I considered etiologies such as acute coronary syndrome, pulmonary embolism, pneumonia, endocarditis. EKG, labs and CXR were ordered but shortly after CXR and labs obtained, patient eloped from the ED. EKG was not obtained. Blood cultures were not obtained. Review of labs after patient eloped revealed negative troponin, negative ddimer, K 4.0, no acute findings. CXR revealed no acute findings. Incidental finding of pulmonary nodule. Unable to relay this information to patient as he had eloped prior to results. This patient was also evaluated by the attending physician. All care plans were discussed and agreed upon. Clinical Impression     1. Chest pain, unspecified type        Disposition     DISPOSITION  Patient eloped.        Fadia Agudelo PA-C  05/30/21 3325

## 2021-05-30 NOTE — ED PROVIDER NOTES
ED Attending Attestation Note     Date of evaluation: 5/30/2021    This patient was seen by the advance practice provider. I have seen and examined the patient, agree with the workup, evaluation, management and diagnosis. The care plan has been discussed. I have reviewed the ECG and concur with the CARA's interpretation. My assessment reveals 45-year-old male with a history of substance abuse and PTSD/anxiety who presented with multiple vague complaints but has a history of IV drug use. We planned initially to obtain laboratory work, chest x-ray and potentially blood cultures given patient's IV drug use and complaints of lightheadedness and mild shortness of breath however patient absconded before these could be obtained, chest x-ray was notable for right upper lobe nodule which was not able to relay this information prior to patient leaving.       Jordan Givens MD  05/30/21 0578

## 2021-05-31 LAB
EKG ATRIAL RATE: 91 BPM
EKG DIAGNOSIS: NORMAL
EKG P AXIS: 74 DEGREES
EKG P-R INTERVAL: 186 MS
EKG Q-T INTERVAL: 372 MS
EKG QRS DURATION: 98 MS
EKG QTC CALCULATION (BAZETT): 457 MS
EKG R AXIS: 70 DEGREES
EKG T AXIS: 67 DEGREES
EKG VENTRICULAR RATE: 91 BPM

## 2021-11-09 ENCOUNTER — HOSPITAL ENCOUNTER (EMERGENCY)
Age: 56
Discharge: HOME | End: 2021-11-09
Payer: MEDICAID

## 2021-11-09 ENCOUNTER — APPOINTMENT (OUTPATIENT)
Dept: GENERAL RADIOLOGY | Age: 56
End: 2021-11-09
Payer: MEDICAID

## 2021-11-09 VITALS
RESPIRATION RATE: 18 BRPM | DIASTOLIC BLOOD PRESSURE: 75 MMHG | HEART RATE: 74 BPM | BODY MASS INDEX: 24.42 KG/M2 | WEIGHT: 174.4 LBS | HEIGHT: 71 IN | SYSTOLIC BLOOD PRESSURE: 142 MMHG | OXYGEN SATURATION: 94 % | TEMPERATURE: 98.3 F

## 2021-11-09 DIAGNOSIS — J12.82 PNEUMONIA DUE TO COVID-19 VIRUS: ICD-10-CM

## 2021-11-09 DIAGNOSIS — U07.1 PNEUMONIA DUE TO COVID-19 VIRUS: ICD-10-CM

## 2021-11-09 DIAGNOSIS — U07.1 COVID-19 VIRUS INFECTION: Primary | ICD-10-CM

## 2021-11-09 LAB
A/G RATIO: 1.4 (ref 1.1–2.2)
ALBUMIN SERPL-MCNC: 3.8 G/DL (ref 3.4–5)
ALP BLD-CCNC: 69 U/L (ref 40–129)
ALT SERPL-CCNC: 23 U/L (ref 10–40)
ANION GAP SERPL CALCULATED.3IONS-SCNC: 13 MMOL/L (ref 3–16)
AST SERPL-CCNC: 25 U/L (ref 15–37)
BASOPHILS ABSOLUTE: 0 K/UL (ref 0–0.2)
BASOPHILS RELATIVE PERCENT: 0.3 %
BILIRUB SERPL-MCNC: 0.6 MG/DL (ref 0–1)
BILIRUBIN URINE: ABNORMAL
BLOOD, URINE: NEGATIVE
BUN BLDV-MCNC: 11 MG/DL (ref 7–20)
CALCIUM SERPL-MCNC: 8.2 MG/DL (ref 8.3–10.6)
CHLORIDE BLD-SCNC: 95 MMOL/L (ref 99–110)
CLARITY: CLEAR
CO2: 24 MMOL/L (ref 21–32)
COLOR: ABNORMAL
CREAT SERPL-MCNC: 0.9 MG/DL (ref 0.9–1.3)
EKG ATRIAL RATE: 104 BPM
EKG DIAGNOSIS: NORMAL
EKG P AXIS: 66 DEGREES
EKG P-R INTERVAL: 172 MS
EKG Q-T INTERVAL: 346 MS
EKG QRS DURATION: 100 MS
EKG QTC CALCULATION (BAZETT): 454 MS
EKG R AXIS: 65 DEGREES
EKG T AXIS: 67 DEGREES
EKG VENTRICULAR RATE: 104 BPM
EOSINOPHILS ABSOLUTE: 0 K/UL (ref 0–0.6)
EOSINOPHILS RELATIVE PERCENT: 0.1 %
EPITHELIAL CELLS, UA: 2 /HPF (ref 0–5)
GFR AFRICAN AMERICAN: >60
GFR NON-AFRICAN AMERICAN: >60
GLUCOSE BLD-MCNC: 112 MG/DL (ref 70–99)
GLUCOSE URINE: NEGATIVE MG/DL
HCT VFR BLD CALC: 39.7 % (ref 40.5–52.5)
HEMOGLOBIN: 13.5 G/DL (ref 13.5–17.5)
HYALINE CASTS: 3 /LPF (ref 0–8)
KETONES, URINE: 40 MG/DL
LACTIC ACID, SEPSIS: 1 MMOL/L (ref 0.4–1.9)
LEUKOCYTE ESTERASE, URINE: NEGATIVE
LIPASE: 20 U/L (ref 13–60)
LYMPHOCYTES ABSOLUTE: 0.2 K/UL (ref 1–5.1)
LYMPHOCYTES RELATIVE PERCENT: 3.2 %
MCH RBC QN AUTO: 29.7 PG (ref 26–34)
MCHC RBC AUTO-ENTMCNC: 33.9 G/DL (ref 31–36)
MCV RBC AUTO: 87.6 FL (ref 80–100)
MICROSCOPIC EXAMINATION: YES
MONOCYTES ABSOLUTE: 0.4 K/UL (ref 0–1.3)
MONOCYTES RELATIVE PERCENT: 7.6 %
NEUTROPHILS ABSOLUTE: 5 K/UL (ref 1.7–7.7)
NEUTROPHILS RELATIVE PERCENT: 88.8 %
NITRITE, URINE: NEGATIVE
PDW BLD-RTO: 13.2 % (ref 12.4–15.4)
PH UA: 7 (ref 5–8)
PLATELET # BLD: 122 K/UL (ref 135–450)
PMV BLD AUTO: 7.8 FL (ref 5–10.5)
POTASSIUM REFLEX MAGNESIUM: 3.7 MMOL/L (ref 3.5–5.1)
PROCALCITONIN: 0.45 NG/ML (ref 0–0.15)
PROTEIN UA: 100 MG/DL
RAPID INFLUENZA  B AGN: NEGATIVE
RAPID INFLUENZA A AGN: NEGATIVE
RBC # BLD: 4.53 M/UL (ref 4.2–5.9)
RBC UA: 2 /HPF (ref 0–4)
SARS-COV-2, NAAT: DETECTED
SODIUM BLD-SCNC: 132 MMOL/L (ref 136–145)
SPECIFIC GRAVITY UA: 1.03 (ref 1–1.03)
TOTAL PROTEIN: 6.6 G/DL (ref 6.4–8.2)
TROPONIN: <0.01 NG/ML
URINE REFLEX TO CULTURE: ABNORMAL
URINE TYPE: ABNORMAL
UROBILINOGEN, URINE: 4 E.U./DL
WBC # BLD: 5.7 K/UL (ref 4–11)
WBC UA: 2 /HPF (ref 0–5)

## 2021-11-09 PROCEDURE — 6370000000 HC RX 637 (ALT 250 FOR IP): Performed by: PHYSICIAN ASSISTANT

## 2021-11-09 PROCEDURE — 99285 EMERGENCY DEPT VISIT HI MDM: CPT

## 2021-11-09 PROCEDURE — 2580000003 HC RX 258: Performed by: PHYSICIAN ASSISTANT

## 2021-11-09 PROCEDURE — 83690 ASSAY OF LIPASE: CPT

## 2021-11-09 PROCEDURE — 71045 X-RAY EXAM CHEST 1 VIEW: CPT

## 2021-11-09 PROCEDURE — 85025 COMPLETE CBC W/AUTO DIFF WBC: CPT

## 2021-11-09 PROCEDURE — 87804 INFLUENZA ASSAY W/OPTIC: CPT

## 2021-11-09 PROCEDURE — 87040 BLOOD CULTURE FOR BACTERIA: CPT

## 2021-11-09 PROCEDURE — 81001 URINALYSIS AUTO W/SCOPE: CPT

## 2021-11-09 PROCEDURE — 2500000003 HC RX 250 WO HCPCS: Performed by: PHYSICIAN ASSISTANT

## 2021-11-09 PROCEDURE — 84145 PROCALCITONIN (PCT): CPT

## 2021-11-09 PROCEDURE — 80053 COMPREHEN METABOLIC PANEL: CPT

## 2021-11-09 PROCEDURE — 83605 ASSAY OF LACTIC ACID: CPT

## 2021-11-09 PROCEDURE — 96375 TX/PRO/DX INJ NEW DRUG ADDON: CPT

## 2021-11-09 PROCEDURE — 36415 COLL VENOUS BLD VENIPUNCTURE: CPT

## 2021-11-09 PROCEDURE — 6360000002 HC RX W HCPCS: Performed by: PHYSICIAN ASSISTANT

## 2021-11-09 PROCEDURE — 93005 ELECTROCARDIOGRAM TRACING: CPT | Performed by: PHYSICIAN ASSISTANT

## 2021-11-09 PROCEDURE — 84484 ASSAY OF TROPONIN QUANT: CPT

## 2021-11-09 PROCEDURE — 93010 ELECTROCARDIOGRAM REPORT: CPT | Performed by: INTERNAL MEDICINE

## 2021-11-09 PROCEDURE — 96365 THER/PROPH/DIAG IV INF INIT: CPT

## 2021-11-09 PROCEDURE — 87635 SARS-COV-2 COVID-19 AMP PRB: CPT

## 2021-11-09 RX ORDER — DEXAMETHASONE SODIUM PHOSPHATE 4 MG/ML
8 INJECTION, SOLUTION INTRA-ARTICULAR; INTRALESIONAL; INTRAMUSCULAR; INTRAVENOUS; SOFT TISSUE ONCE
Status: COMPLETED | OUTPATIENT
Start: 2021-11-09 | End: 2021-11-09

## 2021-11-09 RX ORDER — KETOROLAC TROMETHAMINE 30 MG/ML
30 INJECTION, SOLUTION INTRAMUSCULAR; INTRAVENOUS ONCE
Status: COMPLETED | OUTPATIENT
Start: 2021-11-09 | End: 2021-11-09

## 2021-11-09 RX ORDER — ONDANSETRON 4 MG/1
4 TABLET, ORALLY DISINTEGRATING ORAL EVERY 8 HOURS PRN
Qty: 8 TABLET | Refills: 0 | Status: SHIPPED | OUTPATIENT
Start: 2021-11-09

## 2021-11-09 RX ORDER — ONDANSETRON 2 MG/ML
4 INJECTION INTRAMUSCULAR; INTRAVENOUS ONCE
Status: COMPLETED | OUTPATIENT
Start: 2021-11-09 | End: 2021-11-09

## 2021-11-09 RX ORDER — DEXAMETHASONE 6 MG/1
6 TABLET ORAL DAILY
Qty: 6 TABLET | Refills: 0 | Status: SHIPPED | OUTPATIENT
Start: 2021-11-09 | End: 2021-11-15

## 2021-11-09 RX ORDER — 0.9 % SODIUM CHLORIDE 0.9 %
1000 INTRAVENOUS SOLUTION INTRAVENOUS ONCE
Status: COMPLETED | OUTPATIENT
Start: 2021-11-09 | End: 2021-11-09

## 2021-11-09 RX ORDER — ACETAMINOPHEN 500 MG
1000 TABLET ORAL ONCE
Status: COMPLETED | OUTPATIENT
Start: 2021-11-09 | End: 2021-11-09

## 2021-11-09 RX ADMIN — DEXAMETHASONE SODIUM PHOSPHATE 8 MG: 4 INJECTION, SOLUTION INTRAMUSCULAR; INTRAVENOUS at 16:17

## 2021-11-09 RX ADMIN — ONDANSETRON 4 MG: 2 INJECTION INTRAMUSCULAR; INTRAVENOUS at 13:00

## 2021-11-09 RX ADMIN — KETOROLAC TROMETHAMINE 30 MG: 30 INJECTION, SOLUTION INTRAMUSCULAR at 13:02

## 2021-11-09 RX ADMIN — ACETAMINOPHEN 1000 MG: 500 TABLET ORAL at 13:04

## 2021-11-09 RX ADMIN — SODIUM CHLORIDE 1000 ML: 9 INJECTION, SOLUTION INTRAVENOUS at 13:08

## 2021-11-09 RX ADMIN — IMDEVIMAB: 300 INJECTION, SOLUTION, CONCENTRATE INTRAVENOUS at 15:04

## 2021-11-09 ASSESSMENT — PAIN - FUNCTIONAL ASSESSMENT
PAIN_FUNCTIONAL_ASSESSMENT: PREVENTS OR INTERFERES SOME ACTIVE ACTIVITIES AND ADLS
PAIN_FUNCTIONAL_ASSESSMENT: PREVENTS OR INTERFERES SOME ACTIVE ACTIVITIES AND ADLS

## 2021-11-09 ASSESSMENT — PAIN DESCRIPTION - FREQUENCY
FREQUENCY: CONTINUOUS
FREQUENCY: CONTINUOUS

## 2021-11-09 ASSESSMENT — PAIN SCALES - WONG BAKER
WONGBAKER_NUMERICALRESPONSE: 8
WONGBAKER_NUMERICALRESPONSE: 10

## 2021-11-09 ASSESSMENT — PAIN DESCRIPTION - LOCATION
LOCATION: HEAD
LOCATION: HEAD

## 2021-11-09 ASSESSMENT — PAIN SCALES - GENERAL
PAINLEVEL_OUTOF10: 10
PAINLEVEL_OUTOF10: 10
PAINLEVEL_OUTOF10: 9

## 2021-11-09 ASSESSMENT — PAIN DESCRIPTION - PAIN TYPE: TYPE: ACUTE PAIN

## 2021-11-09 ASSESSMENT — PAIN DESCRIPTION - DESCRIPTORS
DESCRIPTORS: THROBBING
DESCRIPTORS: THROBBING

## 2021-11-09 NOTE — LETTER
[facility] Emergency Department  [address] [place] [address]  Phone: [phone]               November 9, 2021    Patient: Chales Canavan   YOB: 1965   Date of Visit: 11/9/2021       To Whom It May Concern:    Chales Canavan was seen and treated in our emergency department on 11/9/2021. He may return to work on 11/11/2021.       Sincerely,       Sharda Berman RN         Signature:__________________________________

## 2021-11-09 NOTE — ED NOTES
Discharge and education instructions reviewed. Patient verbalized understanding, teach-back successful. Patient denied questions at this time. No acute distress noted. Patient instructed to follow-up as noted - return to emergency department if symptoms worsen. Patient verbalized understanding. Discharged per EDMD with discharged instructions.      Deena Andersen RN  11/09/21 9635

## 2021-11-09 NOTE — ED PROVIDER NOTES
[address] [facility]        Pt Name: Silvana Rodriguez  MRN: [id number]  [place] 1965  Date of evaluation: 11/9/2021  Provider: Arnulfo Dean PA-C  PCP: No primary care provider on file. Note Started: 11:38 AM EST       CARA. I have evaluated this patient. My supervising physician was available for consultation. Gavino Escudero MD      CHIEF COMPLAINT       Chief Complaint   Patient presents with    Nausea     With horrible headache    Emesis       HISTORY OF PRESENT ILLNESS   (Location, Timing/Onset, Context/Setting, Quality, Duration, Modifying Factors, Severity, Associated Signs and Symptoms)  Note limiting factors. Chief Complaint: Illness    Silvana Rodriguez is a 55 y.o. male who presents with complaint of not feeling well. Onset yesterday afternoon between 18 and 24 hours ago. Symptoms include headache, myalgia, nausea and emesis x1, loose stool x1, cough with green sputum productivity at times. Indicates lightheadedness when he stands. He indicates excessive cough with mild shortness of breath. Patient reports out of just the center x4 days. He has not had Covid vaccination. He does smoke. His current pulse 104 and temp is 102.9. Oximetry room air ranging between 91% and 94%. He indicates no abdominal pain or chest pain. Nursing Notes were all reviewed and agreed with or any disagreements were addressed in the HPI. REVIEW OF SYSTEMS    (2-9 systems for level 4, 10 or more for level 5)     Review of Systems    Positives and Pertinent negatives as per HPI. Except as noted above in the ROS, all other systems were reviewed and negative.        PAST MEDICAL HISTORY     Past Medical History:   Diagnosis Date    Anxiety     Drug abuse ([facility] [address] 08/24/2020    polysub abuse - cocaine, marijuana, methamphetamin    PTSD (post-traumatic stress disorder)          SURGICAL HISTORY   No past surgical history on file.      [address]       Discharge Medication List as of 11/9/2021  4:03 PM      CONTINUE these medications which have NOT CHANGED    Details   ibuprofen (ADVIL;MOTRIN) 600 MG tablet Take 1 tablet by mouth every 8 hours as needed for Pain Please take with food. , Disp-40 tablet,R-0Print               ALLERGIES     Patient has no known allergies. FAMILYHISTORY     No family history on file. SOCIAL HISTORY       Social History     Tobacco Use    Smoking status: Current Every Day Smoker     Packs/day: 1.00     Years: 46.00     Pack years: 46.00     Types: Cigarettes    Smokeless tobacco: Never Used   Substance Use Topics    Alcohol use: Yes     Comment: occ    Drug use: Not Currently     Types: Marijuana (Patrizia Chris), Methamphetamines (Crystal Meth), IV, Cocaine     Comment: not since he went to senior living in the beginning of October       [facility]    (up to 7 for level 4, 8 or more for level 5)     ED Triage Vitals [11/09/21 1125]   BP Temp Temp Source Pulse Resp SpO2 Height Weight   124/68 102.9 °F (39.4 °C) Oral 104 16 91 % 5' 11\" (1.803 m) 174 lb 6.4 oz (79.1 kg)       Physical Exam  Vitals and nursing note reviewed. Constitutional:       Appearance: He is well-developed and normal weight. He is ill-appearing. HENT:      Head: Normocephalic and atraumatic. Right Ear: Tympanic membrane, ear canal and external ear normal.      Left Ear: Tympanic membrane, ear canal and external ear normal.      Nose: Congestion present. Mouth/Throat:      Mouth: Mucous membranes are moist.      Pharynx: Oropharynx is clear. Eyes:      General: No scleral icterus. Right eye: No discharge. Left eye: No discharge. Conjunctiva/sclera: Conjunctivae normal.   Cardiovascular:      Rate and Rhythm: Regular rhythm. Tachycardia present. Heart sounds: Normal heart sounds.    Pulmonary:      Effort: Pulmonary effort is normal.      Breath sounds: Rhonchi and rales present. Abdominal:      General: Abdomen is flat. Bowel sounds are normal.      Palpations: Abdomen is soft. Tenderness: There is no abdominal tenderness. Musculoskeletal:         General: Normal range of motion. Cervical back: Normal range of motion and neck supple. No rigidity or tenderness. Right lower leg: No edema. Left lower leg: No edema. Lymphadenopathy:      Cervical: No cervical adenopathy. Skin:     General: Skin is warm and dry. Neurological:      General: No focal deficit present. Mental Status: He is alert and oriented to person, place, and time. Mental status is at baseline. Psychiatric:         Mood and Affect: Mood normal.         Behavior: Behavior normal.         Thought Content:  Thought content normal.         Judgment: Judgment normal.         DIAGNOSTIC RESULTS   LABS:    Labs Reviewed   COVID-19, RAPID - Abnormal; Notable for the following components:       Result Value    SARS-CoV-2, NAAT DETECTED (*)     All other components within normal limits    Narrative:     Performed at:  [facility]  [address] [facility] [employer]   Phone [phone]   CBC WITH AUTO DIFFERENTIAL - Abnormal; Notable for the following components:    Hematocrit 39.7 (*)     Platelets 074 (*)     Lymphocytes Absolute 0.2 (*)     All other components within normal limits    Narrative:     Performed at:  [facility]  [address] [facility] [address]   Phone [phone]   COMPREHENSIVE METABOLIC PANEL W/ REFLEX TO MG FOR LOW K - Abnormal; Notable for the following components:    Sodium 132 (*)     Chloride 95 (*)     Glucose 112 (*)     Calcium 8.2 (*)     All other components within normal limits    Narrative:     Performed at:  [facility]  [address] [facility] [address]   Phone [phone]   URINE RT REFLEX TO CULTURE - Abnormal; Notable for the following components:    Bilirubin Urine SMALL (*)     Ketones, Urine 40 (*)     Protein,  (*)     Urobilinogen, Urine 4.0 (*)     All other components within normal limits    Narrative:     Performed at:  [facility]  [address]   Phone [phone]   PROCALCITONIN - Abnormal; Notable for the following components:    Procalcitonin 0.45 (*)     All other components within normal limits    Narrative:     Performed at:  [facility]  [address]   Phone [phone]   RAPID INFLUENZA A/B ANTIGENS    Narrative:     Performed at:  [facility]  [address]   Phone [phone]   CULTURE, BLOOD 1   CULTURE, BLOOD 2   LIPASE    Narrative:     Performed at:  [facility]  [address]   Phone [phone]   TROPONIN    Narrative:     Performed at:  [facility]  [address] [place] [address]   Phone [phone]   LACTATE, SEPSIS    Narrative:     Performed at:  [facility]  [address] [place], [place] [address]   Phone [phone]   MICROSCOPIC URINALYSIS    Narrative:     Performed at:  [facility]  [address] [place], [place] [address]   Phone [phone]   LACTATE, SEPSIS   VITAMIN D 25 HYDROXY   LACTATE DEHYDROGENASE   FERRITIN   FIBRINOGEN   PROTIME-INR   C-REACTIVE PROTEIN       When ordered only abnormal lab results are displayed. All other labs were within normal range or not returned as of this dictation. EKG: When ordered, EKG's are interpreted by the Emergency Department Physician in the absence of a cardiologist.  Please see their note for interpretation of EKG.     RADIOLOGY:   Non-plain film images such as CT, Ultrasound and MRI are read by the radiologist. Plain radiographic images are visualized and preliminarily interpreted by the ED Provider with the below findings:        Interpretation per the Radiologist below, if available at the time of this note:    XR CHEST PORTABLE   Final Result   Faint left perihilar infiltrate. Correlation with fever white count and   follow-up to resolution recommended. No results found. PROCEDURES   Unless otherwise noted below, none     Procedures    CRITICAL CARE TIME   Critical Care  There was a high probability of life-threatening clinical deterioration in the patient's condition requiring my urgent intervention. Total critical care time with the patient was 40 minutes excluding separately reportable procedures. Critical care required due to patients diagnosis of Covid pneumonia and meeting criteria for Regeneron infusion. Time spent reassessing the patient and educating patient. CONSULTS:  None      EMERGENCY DEPARTMENT COURSE and DIFFERENTIAL DIAGNOSIS/MDM:   Vitals:    Vitals:    11/09/21 1601 11/09/21 1629 11/09/21 1631 11/09/21 1704   BP: 111/71 102/63 100/60 (!) 142/75   Pulse: 80 75 79 74   Resp: 10 12 17 18   Temp:    98.3 °F (36.8 °C)   TempSrc:    Oral   SpO2: 98% 94% 94%    Weight:       Height:           Patient was given the following medications:  Medications   0.9 % sodium chloride bolus (0 mLs IntraVENous Stopped 11/9/21 1420)   ondansetron (ZOFRAN) injection 4 mg (4 mg IntraVENous Given 11/9/21 1300)   ketorolac (TORADOL) injection 30 mg (30 mg IntraVENous Given 11/9/21 1302)   acetaminophen (TYLENOL) tablet 1,000 mg (1,000 mg Oral Given 11/9/21 1304)   dexamethasone (DECADRON) injection 8 mg (8 mg IntraVENous Given 11/9/21 1617)   casirivimab ([id number]) 600 mg, imdevimab ([id number]) 600 mg in sodium chloride 0.9 % 110 mL IVPB ( IntraVENous Stopped 11/9/21 1615)         At 1:45 PM the patient ambulates in department without desaturation.   Clinically the days, Disp-6 tablet, R-0Print             DISCONTINUED MEDICATIONS:  Discharge Medication List as of 11/9/2021  4:03 PM                 (Please note that portions of this note were completed with a voice recognition program.  Efforts were made to edit the dictations but occasionally words are mis-transcribed. )    iMcah Hannon PA-C (electronically signed)           Micah Hannon PA-C  11/09/21 1940

## 2021-11-09 NOTE — ED NOTES
Bed: E-43  Expected date: 11/9/21  Expected time: 11:04 AM  Means of arrival: [facility]  Comments:  56M n/v/ fever     Samuel Landon RN  11/09/21 [id number]

## 2021-11-10 ENCOUNTER — CARE COORDINATION (OUTPATIENT)
Dept: CARE COORDINATION | Age: 56
End: 2021-11-10

## 2021-11-10 NOTE — CARE COORDINATION
Missing or Invalid   #[phone]  LM:  Called patient to follow up with care. Left message and call back number.   #[phone]

## 2021-11-11 ENCOUNTER — CARE COORDINATION (OUTPATIENT)
Dept: CARE COORDINATION | Age: 56
End: 2021-11-11

## 2021-11-11 NOTE — CARE COORDINATION
Not in service  #[phone]  Patient's contact resides in [facility] ([address] [place] [address])  #[phone]  LM:  Called patient to follow up with care. Left message and call back number.   #[phone]  Second Attempt Encounter Resolved

## 2021-11-13 LAB — BLOOD CULTURE, ROUTINE: NORMAL
